# Patient Record
Sex: MALE | Race: WHITE | NOT HISPANIC OR LATINO | Employment: OTHER | URBAN - NONMETROPOLITAN AREA
[De-identification: names, ages, dates, MRNs, and addresses within clinical notes are randomized per-mention and may not be internally consistent; named-entity substitution may affect disease eponyms.]

---

## 2022-06-28 ENCOUNTER — OFFICE VISIT (OUTPATIENT)
Dept: FAMILY MEDICINE CLINIC | Facility: CLINIC | Age: 42
End: 2022-06-28

## 2022-06-28 VITALS
WEIGHT: 152 LBS | SYSTOLIC BLOOD PRESSURE: 174 MMHG | DIASTOLIC BLOOD PRESSURE: 114 MMHG | HEART RATE: 101 BPM | OXYGEN SATURATION: 95 %

## 2022-06-28 DIAGNOSIS — K21.9 CHRONIC GERD: ICD-10-CM

## 2022-06-28 DIAGNOSIS — R03.0 ELEVATED BLOOD PRESSURE READING: ICD-10-CM

## 2022-06-28 DIAGNOSIS — F32.A ANXIETY AND DEPRESSION: Primary | ICD-10-CM

## 2022-06-28 DIAGNOSIS — K59.09 CHRONIC CONSTIPATION: ICD-10-CM

## 2022-06-28 DIAGNOSIS — F41.9 ANXIETY AND DEPRESSION: Primary | ICD-10-CM

## 2022-06-28 DIAGNOSIS — J30.2 SEASONAL ALLERGIES: ICD-10-CM

## 2022-06-28 PROCEDURE — 99214 OFFICE O/P EST MOD 30 MIN: CPT | Performed by: PHYSICIAN ASSISTANT

## 2022-06-28 RX ORDER — VENLAFAXINE HYDROCHLORIDE 75 MG/1
150 CAPSULE, EXTENDED RELEASE ORAL DAILY
COMMUNITY
End: 2022-06-28

## 2022-06-28 RX ORDER — OMEPRAZOLE 40 MG/1
40 CAPSULE, DELAYED RELEASE ORAL DAILY
Qty: 90 CAPSULE | Refills: 1 | Status: SHIPPED | OUTPATIENT
Start: 2022-06-28 | End: 2022-09-30

## 2022-06-28 RX ORDER — LORATADINE 10 MG/1
10 TABLET ORAL DAILY
Qty: 90 TABLET | Refills: 1 | Status: SHIPPED | OUTPATIENT
Start: 2022-06-28 | End: 2023-01-04 | Stop reason: SDUPTHER

## 2022-06-28 RX ORDER — VENLAFAXINE HYDROCHLORIDE 150 MG/1
150 CAPSULE, EXTENDED RELEASE ORAL DAILY
Qty: 90 CAPSULE | Refills: 1 | Status: SHIPPED | OUTPATIENT
Start: 2022-06-28 | End: 2022-09-30

## 2022-06-28 RX ORDER — OMEPRAZOLE 40 MG/1
40 CAPSULE, DELAYED RELEASE ORAL DAILY
COMMUNITY
End: 2022-06-28 | Stop reason: SDUPTHER

## 2022-06-28 NOTE — PROGRESS NOTES
Chief Complaint  Anxiety (Patient reports needing a higher dose of his medicine), Allergies (Patient reports having allergy drainage every morning), and GI Problem (Patient reports stopping his on omeprazole however symptoms have returned)    Subjective        Ramiro Vanegas presents to [facility]  Patient reports today for several complaints.    Patient reports having allergy drainage and states it is worse in the morning reports having blood-tinged mucus that he gets from the back of his throat in the mornings.  Patient denies taking any medication for allergies recently however he has in the past.    Patient reports having acid reflux states he believes his symptoms have been flared up over the past 2 to 3 weeks.  Patient reports his increased abdominal pain, nausea, fullness sensation has caused him to have increased anxiety.  Patient reports he is attempted a bland diet however no relief.  States he started back taking omeprazole this week.    Patient reports history of constipation and internal hemorrhoids.  Patient reports he has been having some constipation over the past couple weeks and states that he had blood when he wiped last week denies any blood for the past 4 days.    Patient reports having anxiety states that he was taking 75 mg however he increased his dose to 150 mg daily over the past month and would like to start taking the higher dose regularly.  Reports it helps better with his symptoms.    Anxiety        Allergies    GI Problem        Objective   Vital Signs:  BP (!) 174/114 (BP Location: Left arm, Patient Position: Sitting, Cuff Size: Adult)   Pulse 101   Wt 68.9 kg (152 lb)   SpO2 95%   There is no height or weight on file to calculate BMI.          Physical Exam  Vitals and nursing note reviewed.   Constitutional:       General: He is not in acute distress.     Appearance: He is not ill-appearing.   Cardiovascular:      Rate and Rhythm: Normal rate and  regular rhythm.      Pulses: Normal pulses.   Pulmonary:      Effort: Pulmonary effort is normal. No respiratory distress.   Abdominal:      General: Bowel sounds are normal. There is no distension.      Palpations: Abdomen is soft.      Tenderness: There is no abdominal tenderness. There is no guarding or rebound.   Musculoskeletal:         General: Normal range of motion.   Skin:     General: Skin is warm and dry.   Psychiatric:         Mood and Affect: Mood normal.        Result Review :                Assessment and Plan   Diagnoses and all orders for this visit:    1. Anxiety and depression (Primary)  Assessment & Plan:  Patient's depression is recurrent and starting to improve with patient increasing dose back to 150 mg.  We will refill patient's medication at 150 mg.  Advised patient to continue to monitor return to clinic if any problems arise.    Orders:  -     venlafaxine XR (EFFEXOR-XR) 150 MG 24 hr capsule; Take 1 capsule by mouth Daily.  Dispense: 90 capsule; Refill: 1    2. Seasonal allergies  Assessment & Plan:  Patient will start loratadine daily    Orders:  -     loratadine (Claritin) 10 MG tablet; Take 1 tablet by mouth Daily. For allergies  Dispense: 90 tablet; Refill: 1    3. Chronic GERD  Assessment & Plan:  Advised patient to restart omeprazole daily, bland diet and return to clinic should symptoms continue or not improved.    Orders:  -     omeprazole (priLOSEC) 40 MG capsule; Take 1 capsule by mouth Daily.  Dispense: 90 capsule; Refill: 1    4. Elevated blood pressure reading  Assessment & Plan:  Discussed at length with patient his blood pressure elevations today.  Provided patient with log and advised him to monitor his blood pressure and return to clinic in 2 weeks if it remains elevated.  Patient acknowledged understanding      5. Chronic constipation  Assessment & Plan:  Vies patient to increase fluids and fiber also advised patient to start using MiraLAX daily.  If bleeding with wiping  returns then return to clinic for follow-up.  Patient declines rectal exam at this date.             Follow Up   No follow-ups on file.  Patient was given instructions and counseling regarding his condition or for health maintenance advice. Please see specific information pulled into the AVS if appropriate.

## 2022-06-28 NOTE — ASSESSMENT & PLAN NOTE
Patient's depression is recurrent and starting to improve with patient increasing dose back to 150 mg.  We will refill patient's medication at 150 mg.  Advised patient to continue to monitor return to clinic if any problems arise.

## 2022-06-28 NOTE — ASSESSMENT & PLAN NOTE
Discussed at length with patient his blood pressure elevations today.  Provided patient with log and advised him to monitor his blood pressure and return to clinic in 2 weeks if it remains elevated.  Patient acknowledged understanding

## 2022-06-28 NOTE — ASSESSMENT & PLAN NOTE
Vies patient to increase fluids and fiber also advised patient to start using MiraLAX daily.  If bleeding with wiping returns then return to clinic for follow-up.  Patient declines rectal exam at this date.

## 2022-06-28 NOTE — ASSESSMENT & PLAN NOTE
Advised patient to restart omeprazole daily, bland diet and return to clinic should symptoms continue or not improved.

## 2022-09-30 DIAGNOSIS — K21.9 CHRONIC GERD: ICD-10-CM

## 2022-09-30 DIAGNOSIS — F32.A ANXIETY AND DEPRESSION: ICD-10-CM

## 2022-09-30 DIAGNOSIS — F41.9 ANXIETY AND DEPRESSION: ICD-10-CM

## 2022-09-30 RX ORDER — OMEPRAZOLE 40 MG/1
40 CAPSULE, DELAYED RELEASE ORAL DAILY
Qty: 90 CAPSULE | Refills: 1 | Status: SHIPPED | OUTPATIENT
Start: 2022-09-30 | End: 2023-01-04 | Stop reason: SDUPTHER

## 2022-09-30 RX ORDER — VENLAFAXINE HYDROCHLORIDE 150 MG/1
CAPSULE, EXTENDED RELEASE ORAL
Qty: 90 CAPSULE | Refills: 1 | Status: SHIPPED | OUTPATIENT
Start: 2022-09-30 | End: 2023-01-04 | Stop reason: SDUPTHER

## 2022-12-16 ENCOUNTER — TELEPHONE (OUTPATIENT)
Dept: FAMILY MEDICINE CLINIC | Facility: CLINIC | Age: 42
End: 2022-12-16

## 2023-01-04 ENCOUNTER — TELEMEDICINE (OUTPATIENT)
Dept: FAMILY MEDICINE CLINIC | Facility: CLINIC | Age: 43
End: 2023-01-04
Payer: MEDICAID

## 2023-01-04 VITALS
WEIGHT: 152 LBS | HEIGHT: 66 IN | SYSTOLIC BLOOD PRESSURE: 160 MMHG | DIASTOLIC BLOOD PRESSURE: 100 MMHG | BODY MASS INDEX: 24.43 KG/M2

## 2023-01-04 DIAGNOSIS — F32.A ANXIETY AND DEPRESSION: ICD-10-CM

## 2023-01-04 DIAGNOSIS — G47.00 INSOMNIA, PERSISTENT: ICD-10-CM

## 2023-01-04 DIAGNOSIS — J30.2 SEASONAL ALLERGIES: ICD-10-CM

## 2023-01-04 DIAGNOSIS — K21.9 CHRONIC GERD: ICD-10-CM

## 2023-01-04 DIAGNOSIS — I10 HYPERTENSION, ESSENTIAL: Primary | ICD-10-CM

## 2023-01-04 DIAGNOSIS — F41.9 ANXIETY AND DEPRESSION: ICD-10-CM

## 2023-01-04 PROBLEM — R03.0 ELEVATED BLOOD PRESSURE READING: Status: RESOLVED | Noted: 2022-06-28 | Resolved: 2023-01-04

## 2023-01-04 PROBLEM — K59.09 CHRONIC CONSTIPATION: Status: RESOLVED | Noted: 2022-06-28 | Resolved: 2023-01-04

## 2023-01-04 PROCEDURE — 99214 OFFICE O/P EST MOD 30 MIN: CPT | Performed by: FAMILY MEDICINE

## 2023-01-04 RX ORDER — OMEPRAZOLE 40 MG/1
40 CAPSULE, DELAYED RELEASE ORAL DAILY
Qty: 90 CAPSULE | Refills: 1 | Status: SHIPPED | OUTPATIENT
Start: 2023-01-04 | End: 2023-02-16 | Stop reason: SDUPTHER

## 2023-01-04 RX ORDER — LORATADINE 10 MG/1
10 TABLET ORAL DAILY
Qty: 90 TABLET | Refills: 1 | Status: SHIPPED | OUTPATIENT
Start: 2023-01-04 | End: 2023-02-16 | Stop reason: SDUPTHER

## 2023-01-04 RX ORDER — VENLAFAXINE HYDROCHLORIDE 150 MG/1
150 CAPSULE, EXTENDED RELEASE ORAL DAILY
Qty: 90 CAPSULE | Refills: 1 | Status: SHIPPED | OUTPATIENT
Start: 2023-01-04 | End: 2023-02-16 | Stop reason: SDUPTHER

## 2023-01-04 RX ORDER — AMLODIPINE AND VALSARTAN 5; 160 MG/1; MG/1
1 TABLET ORAL DAILY
Qty: 30 TABLET | Refills: 1 | Status: SHIPPED | OUTPATIENT
Start: 2023-01-04 | End: 2023-02-16 | Stop reason: SDUPTHER

## 2023-01-04 RX ORDER — TRAZODONE HYDROCHLORIDE 50 MG/1
50-100 TABLET ORAL NIGHTLY PRN
Qty: 60 TABLET | Refills: 1 | Status: SHIPPED | OUTPATIENT
Start: 2023-01-04 | End: 2023-02-16 | Stop reason: SDUPTHER

## 2023-01-04 NOTE — ASSESSMENT & PLAN NOTE
Discussed poorly controlled blood pressure readings from home and reviewed last visit in the office.    Treatment options discussed.    Given degree of elevation will start Exforge.  Side effects reviewed.    Return for an office visit for physical exam and blood pressure check next month.  We will also get fasting blood work up-to-date at follow-up    Patient voiced understanding is comfortable plan.

## 2023-01-04 NOTE — ASSESSMENT & PLAN NOTE
Continue with Effexor XR.  Added trazodone for recurrent problems with insomnia but did discuss this is likely related to his alcohol use in part.    Recheck in 1 month or sooner problems arise.  Side effects of  trazodone reviewed.

## 2023-01-04 NOTE — PROGRESS NOTES
Patient was seen today through synchronous audio/video technology. Verbal consent was obtained. The patient was located at home. Vitals signs were not obtained due to lack of home monitoring access.       I was located at our [facility] office for this telehealth visit.    Chief Complaint  Insomnia (Patient is having trouble sleeping at night)    History of Present Illness  Ramiro Vanegas is a 42 y.o. male presenting via video-conference today for follow-up visit medication refills.    At his last in office visit his blood pressure was elevated and he was instructed to check a blood pressure log and return with readings.  He has been checking blood pressures but did not return or notify us of ongoing blood pressure elevation.  Most readings are in the 160s systolic and 100s diastolic.  No chest pain or chest pressure.    He does continue to drink on a regular basis and is still smoking.  He does understand that both alcohol and tobacco are likely contributing to his blood pressure elevation.    He has plans to work on cutting back or quitting.    He will return next months for his physical.    Mood stable with Effexor but having insomnia issues.  We did discuss that alcohol can greatly contribute to this and he would like to try trazodone while he works on cutting back on alcohol use.    Doing well with Claritin for allergies.    GERD stable with omeprazole        The following portions of the patient's history were reviewed and updated as appropriate: allergies, current medications, past family history, past medical history, past social history, past surgical history and problem list.    Review of Systems   Constitutional: Negative for appetite change, chills, fever and unexpected weight change.   HENT: Negative for hearing loss.    Eyes: Negative for visual disturbance.   Respiratory: Negative for chest tightness, shortness of breath and wheezing.    Cardiovascular: Negative for  chest pain, palpitations and leg swelling.   Gastrointestinal: Negative for abdominal pain.   Musculoskeletal: Negative for arthralgias, back pain and gait problem.   Skin: Negative for rash.   Neurological: Negative for dizziness and headaches.   Psychiatric/Behavioral: Positive for sleep disturbance. Negative for agitation and confusion. The patient is not nervous/anxious.        Objective  /100   Ht 167.6 cm (66\")   Wt 68.9 kg (152 lb)   BMI 24.53 kg/m²     Physical Exam  Constitutional:       General: He is not in acute distress.     Appearance: Normal appearance. He is not ill-appearing.   HENT:      Head: Normocephalic and atraumatic.      Right Ear: External ear normal.      Left Ear: External ear normal.      Nose: Nose normal.   Eyes:      Extraocular Movements: Extraocular movements intact.      Conjunctiva/sclera: Conjunctivae normal.      Pupils: Pupils are equal, round, and reactive to light.   Pulmonary:      Effort: Pulmonary effort is normal. No respiratory distress.   Musculoskeletal:         General: Normal range of motion.      Cervical back: Normal range of motion.   Skin:     Findings: No rash.   Neurological:      General: No focal deficit present.      Mental Status: He is alert and oriented to person, place, and time.   Psychiatric:         Mood and Affect: Mood normal.         Behavior: Behavior normal.           Assessment/Plan   Diagnoses and all orders for this visit:    1. Hypertension, essential (Primary)  Assessment & Plan:  Discussed poorly controlled blood pressure readings from home and reviewed last visit in the office.    Treatment options discussed.    Given degree of elevation will start Exforge.  Side effects reviewed.    Return for an office visit for physical exam and blood pressure check next month.  We will also get fasting blood work up-to-date at follow-up    Patient voiced understanding is comfortable plan.    Orders:  -     amLODIPine-valsartan (Exforge) 5-160  MG per tablet; Take 1 tablet by mouth Daily. For blood pressure  Dispense: 30 tablet; Refill: 1    2. Anxiety and depression  Assessment & Plan:  Continue with Effexor XR.  Added trazodone for recurrent problems with insomnia but did discuss this is likely related to his alcohol use in part.    Recheck in 1 month or sooner problems arise.  Side effects of  trazodone reviewed.    Orders:  -     venlafaxine XR (EFFEXOR-XR) 150 MG 24 hr capsule; Take 1 capsule by mouth Daily.  Dispense: 90 capsule; Refill: 1    3. Seasonal allergies  Assessment & Plan:  Continue Claritin.    Orders:  -     loratadine (Claritin) 10 MG tablet; Take 1 tablet by mouth Daily. For allergies  Dispense: 90 tablet; Refill: 1    4. Chronic GERD  Assessment & Plan:  Controlled with omeprazole.    Advised to work on alcohol and tobacco cessation.    Orders:  -     omeprazole (priLOSEC) 40 MG capsule; Take 1 capsule by mouth Daily.  Dispense: 90 capsule; Refill: 1    5. Insomnia, persistent  Assessment & Plan:  See above    Orders:  -     traZODone (DESYREL) 50 MG tablet; Take 1-2 tablets by mouth At Night As Needed for Sleep.  Dispense: 60 tablet; Refill: 1    BMI is within normal parameters. No other follow-up for BMI required.    Return in about 4 weeks (around 2/1/2023) for Annual physical, Med recheck.    Scott Hernández MD

## 2023-01-25 ENCOUNTER — PATIENT MESSAGE (OUTPATIENT)
Dept: FAMILY MEDICINE CLINIC | Facility: CLINIC | Age: 43
End: 2023-01-25
Payer: MEDICAID

## 2023-01-25 NOTE — TELEPHONE ENCOUNTER
From: Ramiro Vanegas  To: Scott Hernández  Sent: 1/25/2023 1:21 PM EST  Subject: Ramiro Hernández , I had some teeth cut on Monday 16th. I have had several issue with them. Now I have a couple of ulcers in my mouth on the tongue . It hurts to eat, swallow , or talk. What can I do , to fix this . I called to make an appointment with you but they schedule it with someone on the 31st. Could you please call me [phone] thank you

## 2023-02-16 ENCOUNTER — TELEMEDICINE (OUTPATIENT)
Dept: FAMILY MEDICINE CLINIC | Facility: CLINIC | Age: 43
End: 2023-02-16
Payer: MEDICAID

## 2023-02-16 DIAGNOSIS — F32.A ANXIETY AND DEPRESSION: ICD-10-CM

## 2023-02-16 DIAGNOSIS — K21.9 CHRONIC GERD: ICD-10-CM

## 2023-02-16 DIAGNOSIS — F41.9 ANXIETY AND DEPRESSION: ICD-10-CM

## 2023-02-16 DIAGNOSIS — I10 HYPERTENSION, ESSENTIAL: ICD-10-CM

## 2023-02-16 DIAGNOSIS — G47.00 INSOMNIA, PERSISTENT: ICD-10-CM

## 2023-02-16 DIAGNOSIS — J30.2 SEASONAL ALLERGIES: ICD-10-CM

## 2023-02-16 PROCEDURE — 99214 OFFICE O/P EST MOD 30 MIN: CPT | Performed by: FAMILY MEDICINE

## 2023-02-16 RX ORDER — AMLODIPINE AND VALSARTAN 5; 160 MG/1; MG/1
1 TABLET ORAL DAILY
Qty: 90 TABLET | Refills: 1 | Status: SHIPPED | OUTPATIENT
Start: 2023-02-16 | End: 2023-03-29

## 2023-02-16 RX ORDER — BUPROPION HYDROCHLORIDE 150 MG/1
150 TABLET ORAL EVERY MORNING
Qty: 90 TABLET | Refills: 1 | Status: SHIPPED | OUTPATIENT
Start: 2023-02-16 | End: 2023-03-29

## 2023-02-16 RX ORDER — VENLAFAXINE HYDROCHLORIDE 150 MG/1
150 CAPSULE, EXTENDED RELEASE ORAL DAILY
Qty: 90 CAPSULE | Refills: 1 | Status: SHIPPED | OUTPATIENT
Start: 2023-02-16 | End: 2023-03-29 | Stop reason: SDUPTHER

## 2023-02-16 RX ORDER — LORATADINE 10 MG/1
10 TABLET ORAL DAILY
Qty: 90 TABLET | Refills: 1 | Status: SHIPPED | OUTPATIENT
Start: 2023-02-16 | End: 2023-03-29 | Stop reason: SDUPTHER

## 2023-02-16 RX ORDER — TRAZODONE HYDROCHLORIDE 50 MG/1
50-100 TABLET ORAL NIGHTLY PRN
Qty: 180 TABLET | Refills: 1 | Status: SHIPPED | OUTPATIENT
Start: 2023-02-16 | End: 2023-03-29

## 2023-02-16 RX ORDER — OMEPRAZOLE 40 MG/1
40 CAPSULE, DELAYED RELEASE ORAL DAILY
Qty: 90 CAPSULE | Refills: 1 | Status: SHIPPED | OUTPATIENT
Start: 2023-02-16 | End: 2023-03-29 | Stop reason: SDUPTHER

## 2023-02-16 NOTE — ASSESSMENT & PLAN NOTE
Continue with Effexor XR and trazodone.  We will add in Wellbutrin XL to help with sexual side effects noted with Effexor XR.    No history of seizures.    Discussed Wellbutrin XL can help with cravings but naltrexone is typically better.  He will consider follow-up if not improving with cravings and work with counseling for alcohol use to consider naltrexone..

## 2023-02-16 NOTE — ASSESSMENT & PLAN NOTE
Feeling much better with Exforge.  Encouraged home blood pressure checks and he will send us blood pressure readings for chart update..

## 2023-02-16 NOTE — PROGRESS NOTES
Patient was seen today through synchronous audio/video technology. Verbal consent was obtained. The patient was located at home. Vitals signs were not obtained due to lack of home monitoring access.     I was located at our [facility] office for this telehealth visit.    Chief Complaint  No chief complaint on file.    History of Present Illness  Ramiro Vanegas is a 43 y.o. male presenting via video-conference today for follow-up after starting on Exforge for hypertension along with trazodone for insomnia.    He does girlfriend and been going to counseling which has been helpful in general for relationship struggles but he still has had some problems with sexual side effects with his Effexor.  He would like to try adding Wellbutrin XL to help with the sexual side effects with delayed orgasm.    He is working on cutting back and quitting drinking and we discussed Wellbutrin can sometimes help cravings but naltrexone is typically better.  He would like to start with Wellbutrin XL first and if needed we will follow-up to discuss naltrexone treatment options for alcohol cravings.    Doing well with omeprazole for GERD.    Trazodone is working well for insomnia.    He will get his some home blood pressure checks to update his chart after starting Exforge but reports headaches have resolved and he has been feeling much better in the past few weeks compared to how he has felt in the past few months exclamation    The following portions of the patient's history were reviewed and updated as appropriate: allergies, current medications, past family history, past medical history, past social history, past surgical history and problem list.    Review of Systems   Constitutional: Negative for appetite change, chills, fever and unexpected weight change.   HENT: Negative for hearing loss.    Eyes: Negative for visual disturbance.   Respiratory: Negative for chest tightness, shortness of breath and  wheezing.    Cardiovascular: Negative for chest pain, palpitations and leg swelling.   Gastrointestinal: Negative for abdominal pain.   Genitourinary:        See HPI   Musculoskeletal: Negative for arthralgias, back pain and gait problem.   Skin: Negative for rash.   Neurological: Negative for dizziness and headaches.   Psychiatric/Behavioral: Negative for agitation and confusion. The patient is nervous/anxious.        Objective  There were no vitals taken for this visit.    Physical Exam  Constitutional:       General: He is not in acute distress.     Appearance: Normal appearance. He is not ill-appearing.   HENT:      Head: Normocephalic and atraumatic.      Right Ear: External ear normal.      Left Ear: External ear normal.      Nose: Nose normal.   Eyes:      Extraocular Movements: Extraocular movements intact.      Conjunctiva/sclera: Conjunctivae normal.      Pupils: Pupils are equal, round, and reactive to light.   Pulmonary:      Effort: Pulmonary effort is normal. No respiratory distress.   Musculoskeletal:         General: Normal range of motion.      Cervical back: Normal range of motion.   Skin:     Findings: No rash.   Neurological:      General: No focal deficit present.      Mental Status: He is alert and oriented to person, place, and time.   Psychiatric:         Mood and Affect: Mood normal.         Behavior: Behavior normal.         Thought Content: Thought content normal.      Comments: Mood improving with combination of Effexor XR and trazodone.  Sexual side effects with delayed orgasm noted with Effexor.  Interested in adding Wellbutrin XL.  No history of seizures           Assessment/Plan   Diagnoses and all orders for this visit:    1. Anxiety and depression  Assessment & Plan:  Continue with Effexor XR and trazodone.  We will add in Wellbutrin XL to help with sexual side effects noted with Effexor XR.    No history of seizures.    Discussed Wellbutrin XL can help with cravings but naltrexone  is typically better.  He will consider follow-up if not improving with cravings and work with counseling for alcohol use to consider naltrexone..    Orders:  -     venlafaxine XR (EFFEXOR-XR) 150 MG 24 hr capsule; Take 1 capsule by mouth Daily.  Dispense: 90 capsule; Refill: 1    2. Insomnia, persistent  Assessment & Plan:  Continue trazodone.    Orders:  -     traZODone (DESYREL) 50 MG tablet; Take 1-2 tablets by mouth At Night As Needed for Sleep.  Dispense: 180 tablet; Refill: 1    3. Chronic GERD  Assessment & Plan:  Controlled with omeprazole.  Alcohol cessation encouraged    Orders:  -     omeprazole (priLOSEC) 40 MG capsule; Take 1 capsule by mouth Daily.  Dispense: 90 capsule; Refill: 1    4. Seasonal allergies  Assessment & Plan:  Stable controlled with current regimen    Orders:  -     loratadine (Claritin) 10 MG tablet; Take 1 tablet by mouth Daily. For allergies  Dispense: 90 tablet; Refill: 1    5. Hypertension, essential  Assessment & Plan:  Feeling much better with Exforge.  Encouraged home blood pressure checks and he will send us blood pressure readings for chart update..    Orders:  -     amLODIPine-valsartan (Exforge) 5-160 MG per tablet; Take 1 tablet by mouth Daily. For blood pressure  Dispense: 90 tablet; Refill: 1    Other orders  -     buPROPion XL (Wellbutrin XL) 150 MG 24 hr tablet; Take 1 tablet by mouth Every Morning.  Dispense: 90 tablet; Refill: 1      BMI is within normal parameters. No other follow-up for BMI required.       Return in about 6 months (around 8/16/2023) for Med recheck.    Scott Hernández MD

## 2023-03-29 ENCOUNTER — OFFICE VISIT (OUTPATIENT)
Dept: FAMILY MEDICINE CLINIC | Facility: CLINIC | Age: 43
End: 2023-03-29
Payer: MEDICAID

## 2023-03-29 VITALS
WEIGHT: 175 LBS | SYSTOLIC BLOOD PRESSURE: 140 MMHG | DIASTOLIC BLOOD PRESSURE: 100 MMHG | BODY MASS INDEX: 28.12 KG/M2 | HEIGHT: 66 IN

## 2023-03-29 DIAGNOSIS — Z12.5 PROSTATE CANCER SCREENING: ICD-10-CM

## 2023-03-29 DIAGNOSIS — G47.00 INSOMNIA, PERSISTENT: Chronic | ICD-10-CM

## 2023-03-29 DIAGNOSIS — F32.A ANXIETY AND DEPRESSION: Chronic | ICD-10-CM

## 2023-03-29 DIAGNOSIS — Z13.1 DIABETES MELLITUS SCREENING: ICD-10-CM

## 2023-03-29 DIAGNOSIS — F41.9 ANXIETY AND DEPRESSION: Chronic | ICD-10-CM

## 2023-03-29 DIAGNOSIS — Z00.00 GENERAL MEDICAL EXAM: Primary | ICD-10-CM

## 2023-03-29 DIAGNOSIS — J30.2 SEASONAL ALLERGIES: Chronic | ICD-10-CM

## 2023-03-29 DIAGNOSIS — M79.672 LEFT FOOT PAIN: ICD-10-CM

## 2023-03-29 DIAGNOSIS — F10.20 ALCOHOL USE DISORDER, SEVERE, DEPENDENCE: ICD-10-CM

## 2023-03-29 DIAGNOSIS — K21.9 CHRONIC GERD: Chronic | ICD-10-CM

## 2023-03-29 DIAGNOSIS — I10 HYPERTENSION, ESSENTIAL: Chronic | ICD-10-CM

## 2023-03-29 RX ORDER — OMEPRAZOLE 40 MG/1
40 CAPSULE, DELAYED RELEASE ORAL DAILY
Qty: 90 CAPSULE | Refills: 1 | Status: SHIPPED | OUTPATIENT
Start: 2023-03-29

## 2023-03-29 RX ORDER — LORATADINE 10 MG/1
10 TABLET ORAL DAILY
Qty: 90 TABLET | Refills: 1 | Status: SHIPPED | OUTPATIENT
Start: 2023-03-29

## 2023-03-29 RX ORDER — NALTREXONE HYDROCHLORIDE 50 MG/1
50 TABLET, FILM COATED ORAL NIGHTLY
Qty: 30 TABLET | Refills: 2 | Status: SHIPPED | OUTPATIENT
Start: 2023-03-29

## 2023-03-29 RX ORDER — VENLAFAXINE HYDROCHLORIDE 150 MG/1
150 CAPSULE, EXTENDED RELEASE ORAL DAILY
Qty: 90 CAPSULE | Refills: 1 | Status: SHIPPED | OUTPATIENT
Start: 2023-03-29

## 2023-03-29 RX ORDER — AMLODIPINE AND VALSARTAN 10; 320 MG/1; MG/1
1 TABLET ORAL DAILY
Qty: 90 TABLET | Refills: 1 | Status: SHIPPED | OUTPATIENT
Start: 2023-03-29

## 2023-03-29 NOTE — ASSESSMENT & PLAN NOTE
Discussed together health maintenance and screening along with vaccination options and healthy diet and exercise habits as part of the preventative counseling at their physical exam today.

## 2023-03-29 NOTE — ASSESSMENT & PLAN NOTE
Hypertension is worsening.  Medication changes per orders.  Blood pressure will be reassessed in 4 weeks     Alcohol cessation encouraged along with inpatient detox.  Patient declines.    He wants to work on cutting back on his drinking.    We will adjust up his Exforge to 10/320 and recheck on blood pressure in 1 month.

## 2023-03-29 NOTE — PROGRESS NOTES
"Chief Complaint  Annual Exam (Patient is fasting)    Subjective    History of Present Illness:  Ramiro Vanegas is a 43 y.o. male who presents today for physical exam and fasting lab work.    He is here with his current girlfriend and continues to drink on a daily basis.  He tends to drink between 6 and 15 beers daily.  He is started having an eye opener each morning to help with tremors.  We have discussed at length his alcoholism and need for inpatient detoxification to prevent problems with withdrawal seizures and DTs.  He is not ready for detox at this time.    He has had some family members and friends do well with naltrexone and would like to try that help with cravings and is dalton work on cutting back on his drinking even though we discussed typically this will not work given his alcoholic tendencies.  We did discuss detox and abstinence again to be the best methods for him to be successful regarding his severe alcohol use disorder.    He voiced understanding but is not ready at this point for detox.    Blood pressure is uncontrolled today and he has not missed any doses of Exforge.  He would like to go up to the higher dosing before adding another medicine.    Side effects with Wellbutrin XL so is only taking Effexor XR for anxiety.  Again, would like to add in naltrexone to help with cravings    GERD stable with omeprazole.    Willing for PSA with fasting labs today      Objective   Vital Signs:   /100 (BP Location: Left arm, Patient Position: Sitting, Cuff Size: Adult)   Ht 167.6 cm (66\")   Wt 79.4 kg (175 lb)   BMI 28.25 kg/m²     Review of Systems   Constitutional: Negative for appetite change, chills and fever.   HENT: Negative for hearing loss.    Eyes: Negative for blurred vision.   Respiratory: Negative for chest tightness.    Cardiovascular: Negative for chest pain.   Gastrointestinal: Negative for abdominal pain.   Genitourinary: Positive for erectile dysfunction.   Musculoskeletal: Negative " for gait problem.        Left foot pain after trip and fall injury at home.  Negative x-rays in [facility].   Skin: Negative for rash.   Neurological: Positive for tremors.        Tremors related to alcohol withdrawal.   Psychiatric/Behavioral: Negative for depressed mood. The patient is nervous/anxious.         See HPI       Past History:  Medical History: has a past medical history of GERD without esophagitis and Recurrent major depression in partial remission (HCC).   Surgical History: has no past surgical history on file.   Family History: family history includes Breast cancer in his mother; Depression in his mother; Diabetes in his father; Hearing loss in his father; Hypertension in his mother.   Social History: reports that he has been smoking. He has never used smokeless tobacco. Alcohol use questions deferred to the physician. He reports that he does not use drugs.      Current Outpatient Medications:   •  loratadine (Claritin) 10 MG tablet, Take 1 tablet by mouth Daily. For allergies, Disp: 90 tablet, Rfl: 1  •  omeprazole (priLOSEC) 40 MG capsule, Take 1 capsule by mouth Daily., Disp: 90 capsule, Rfl: 1  •  venlafaxine XR (EFFEXOR-XR) 150 MG 24 hr capsule, Take 1 capsule by mouth Daily., Disp: 90 capsule, Rfl: 1  •  amLODIPine-valsartan (Exforge)  MG per tablet, Take 1 tablet by mouth Daily., Disp: 90 tablet, Rfl: 1  •  naltrexone (DEPADE) 50 MG tablet, Take 1 tablet by mouth Every Night., Disp: 30 tablet, Rfl: 2    Allergies: Patient has no known allergies.    Physical Exam  HENT:      Head: Normocephalic.      Right Ear: External ear normal.      Left Ear: External ear normal.      Nose: Nose normal.   Eyes:      Pupils: Pupils are equal, round, and reactive to light.   Cardiovascular:      Rate and Rhythm: Normal rate and regular rhythm.      Heart sounds: Normal heart sounds.   Pulmonary:      Effort: Pulmonary effort is normal.      Breath sounds: Normal breath sounds.   Musculoskeletal:       Cervical back: Normal range of motion.      Comments: Left foot with mild mid metatarsal bruising.  Fourth and fifth metatarsal tenderness on exam without evidence of displacement   Skin:     General: Skin is warm and dry.   Neurological:      General: No focal deficit present.      Mental Status: He is alert.   Psychiatric:         Behavior: Behavior normal.      Comments: Discussed openly his severe alcohol use disorder and recommendation for inpatient detox followed by AA participation in naltrexone as a tool to help with cravings and relapse prevention.  He is not yet ready to quit drinking but understands this would be the recommended path given the severity of his alcohol use disorder and symptoms that are suggestive for high risk for alcohol withdrawal seizures and DTs           Result Review                   Assessment and Plan  Diagnoses and all orders for this visit:    1. General medical exam (Primary)  Assessment & Plan:  Discussed together health maintenance and screening along with vaccination options and healthy diet and exercise habits as part of the preventative counseling at their physical exam today.    Orders:  -     CBC Auto Differential; Future  -     Comprehensive Metabolic Panel; Future  -     Lipid Panel; Future  -     TSH; Future  -     T4, Free; Future  -     CBC Auto Differential  -     Comprehensive Metabolic Panel  -     Lipid Panel  -     TSH  -     T4, Free    2. Diabetes mellitus screening  -     Hemoglobin A1c; Future  -     Hemoglobin A1c    3. Hypertension, essential  Assessment & Plan:  Hypertension is worsening.  Medication changes per orders.  Blood pressure will be reassessed in 4 weeks     Alcohol cessation encouraged along with inpatient detox.  Patient declines.    He wants to work on cutting back on his drinking.    We will adjust up his Exforge to 10/320 and recheck on blood pressure in 1 month.    Orders:  -     CBC Auto Differential; Future  -     Comprehensive  Metabolic Panel; Future  -     Lipid Panel; Future  -     TSH; Future  -     T4, Free; Future  -     amLODIPine-valsartan (Exforge)  MG per tablet; Take 1 tablet by mouth Daily.  Dispense: 90 tablet; Refill: 1  -     CBC Auto Differential  -     Comprehensive Metabolic Panel  -     Lipid Panel  -     TSH  -     T4, Free    4. Insomnia, persistent  Assessment & Plan:  Off trazodone given headaches.  Discussed his alcohol use disorder may be negatively affecting his sleep patterns.  Encouraged alcohol cessation as above.      5. Anxiety and depression  Assessment & Plan:  Continue Effexor XR.  We discussed naltrexone as a tool to help with cravings and relapse prevention given his desire to cut back on drinking.  Side effects and expectations discussed.  Recheck at follow-up in 1 month.  We did discuss the risk of liver enzyme elevation with naltrexone today.    Orders:  -     venlafaxine XR (EFFEXOR-XR) 150 MG 24 hr capsule; Take 1 capsule by mouth Daily.  Dispense: 90 capsule; Refill: 1    6. Chronic GERD  Assessment & Plan:  Well-controlled with omeprazole    Orders:  -     omeprazole (priLOSEC) 40 MG capsule; Take 1 capsule by mouth Daily.  Dispense: 90 capsule; Refill: 1    7. Seasonal allergies  Assessment & Plan:  Continue Flonase and Claritin    Orders:  -     loratadine (Claritin) 10 MG tablet; Take 1 tablet by mouth Daily. For allergies  Dispense: 90 tablet; Refill: 1    8. Prostate cancer screening  -     PSA Screen; Future  -     naltrexone (DEPADE) 50 MG tablet; Take 1 tablet by mouth Every Night.  Dispense: 30 tablet; Refill: 2  -     PSA Screen    9. Alcohol use disorder, severe, dependence (HCC)  Assessment & Plan:  See above      10. Left foot pain  Assessment & Plan:  Mild bruising and mid metatarsal tenderness.  We will recheck x-ray today given his initial x-ray was done on the day of injury and may have missed a subtle fracture.    If he has ongoing pain plan for podiatry consultation and  MRI imaging.    Orders:  -     XR Foot 3+ View Left; Future      BMI is >= 25 and <30. (Overweight) The following options were offered after discussion;: exercise counseling/recommendations and nutrition counseling/recommendations          Follow Up  Return in about 4 weeks (around 4/26/2023) for Med recheck.    Scott Hernández MD

## 2023-03-29 NOTE — ASSESSMENT & PLAN NOTE
Off trazodone given headaches.  Discussed his alcohol use disorder may be negatively affecting his sleep patterns.  Encouraged alcohol cessation as above.

## 2023-03-29 NOTE — ASSESSMENT & PLAN NOTE
Mild bruising and mid metatarsal tenderness.  We will recheck x-ray today given his initial x-ray was done on the day of injury and may have missed a subtle fracture.    If he has ongoing pain plan for podiatry consultation and MRI imaging.

## 2023-03-29 NOTE — ASSESSMENT & PLAN NOTE
Continue Effexor XR.  We discussed naltrexone as a tool to help with cravings and relapse prevention given his desire to cut back on drinking.  Side effects and expectations discussed.  Recheck at follow-up in 1 month.  We did discuss the risk of liver enzyme elevation with naltrexone today.

## 2023-03-30 DIAGNOSIS — E78.2 HYPERLIPIDEMIA, MIXED: Primary | ICD-10-CM

## 2023-03-30 LAB
ALBUMIN SERPL-MCNC: 5.2 G/DL (ref 4–5)
ALBUMIN/GLOB SERPL: 2.1 {RATIO} (ref 1.2–2.2)
ALP SERPL-CCNC: 105 IU/L (ref 44–121)
ALT SERPL-CCNC: 39 IU/L (ref 0–44)
AST SERPL-CCNC: 31 IU/L (ref 0–40)
BASOPHILS # BLD AUTO: 0.1 X10E3/UL (ref 0–0.2)
BASOPHILS NFR BLD AUTO: 1 %
BILIRUB SERPL-MCNC: 0.3 MG/DL (ref 0–1.2)
BUN SERPL-MCNC: 13 MG/DL (ref 6–24)
BUN/CREAT SERPL: 17 (ref 9–20)
CALCIUM SERPL-MCNC: 10.2 MG/DL (ref 8.7–10.2)
CHLORIDE SERPL-SCNC: 100 MMOL/L (ref 96–106)
CHOLEST SERPL-MCNC: 266 MG/DL (ref 100–199)
CO2 SERPL-SCNC: 22 MMOL/L (ref 20–29)
CREAT SERPL-MCNC: 0.78 MG/DL (ref 0.76–1.27)
EGFRCR SERPLBLD CKD-EPI 2021: 115 ML/MIN/1.73
EOSINOPHIL # BLD AUTO: 0 X10E3/UL (ref 0–0.4)
EOSINOPHIL NFR BLD AUTO: 0 %
ERYTHROCYTE [DISTWIDTH] IN BLOOD BY AUTOMATED COUNT: 12.2 % (ref 11.6–15.4)
GLOBULIN SER CALC-MCNC: 2.5 G/DL (ref 1.5–4.5)
GLUCOSE SERPL-MCNC: 100 MG/DL (ref 70–99)
HBA1C MFR BLD: 5.6 % (ref 4.8–5.6)
HCT VFR BLD AUTO: 46.4 % (ref 37.5–51)
HDLC SERPL-MCNC: 93 MG/DL
HGB BLD-MCNC: 15.2 G/DL (ref 13–17.7)
IMM GRANULOCYTES # BLD AUTO: 0.1 X10E3/UL (ref 0–0.1)
IMM GRANULOCYTES NFR BLD AUTO: 1 %
LDLC SERPL CALC-MCNC: 162 MG/DL (ref 0–99)
LYMPHOCYTES # BLD AUTO: 1.9 X10E3/UL (ref 0.7–3.1)
LYMPHOCYTES NFR BLD AUTO: 15 %
MCH RBC QN AUTO: 30.7 PG (ref 26.6–33)
MCHC RBC AUTO-ENTMCNC: 32.8 G/DL (ref 31.5–35.7)
MCV RBC AUTO: 94 FL (ref 79–97)
MONOCYTES # BLD AUTO: 1 X10E3/UL (ref 0.1–0.9)
MONOCYTES NFR BLD AUTO: 8 %
NEUTROPHILS # BLD AUTO: 9.6 X10E3/UL (ref 1.4–7)
NEUTROPHILS NFR BLD AUTO: 75 %
PLATELET # BLD AUTO: 370 X10E3/UL (ref 150–450)
POTASSIUM SERPL-SCNC: 4.6 MMOL/L (ref 3.5–5.2)
PROT SERPL-MCNC: 7.7 G/DL (ref 6–8.5)
PSA SERPL-MCNC: 0.5 NG/ML (ref 0–4)
RBC # BLD AUTO: 4.95 X10E6/UL (ref 4.14–5.8)
SODIUM SERPL-SCNC: 139 MMOL/L (ref 134–144)
T4 FREE SERPL-MCNC: 1.02 NG/DL (ref 0.82–1.77)
TRIGL SERPL-MCNC: 70 MG/DL (ref 0–149)
TSH SERPL DL<=0.005 MIU/L-ACNC: 1.77 UIU/ML (ref 0.45–4.5)
VLDLC SERPL CALC-MCNC: 11 MG/DL (ref 5–40)
WBC # BLD AUTO: 12.6 X10E3/UL (ref 3.4–10.8)

## 2023-03-30 RX ORDER — ROSUVASTATIN CALCIUM 20 MG/1
20 TABLET, COATED ORAL DAILY
Qty: 90 TABLET | Refills: 1 | Status: SHIPPED | OUTPATIENT
Start: 2023-03-30

## 2023-04-03 ENCOUNTER — TELEPHONE (OUTPATIENT)
Dept: FAMILY MEDICINE CLINIC | Facility: CLINIC | Age: 43
End: 2023-04-03

## 2023-05-03 ENCOUNTER — TELEMEDICINE (OUTPATIENT)
Dept: FAMILY MEDICINE CLINIC | Facility: CLINIC | Age: 43
End: 2023-05-03
Payer: MEDICAID

## 2023-05-03 VITALS
BODY MASS INDEX: 28.61 KG/M2 | HEIGHT: 66 IN | DIASTOLIC BLOOD PRESSURE: 80 MMHG | WEIGHT: 178 LBS | SYSTOLIC BLOOD PRESSURE: 135 MMHG

## 2023-05-03 DIAGNOSIS — I10 HYPERTENSION, ESSENTIAL: Primary | Chronic | ICD-10-CM

## 2023-05-03 DIAGNOSIS — F10.20 ALCOHOL USE DISORDER, SEVERE, DEPENDENCE: ICD-10-CM

## 2023-05-03 DIAGNOSIS — K21.9 CHRONIC GERD: Chronic | ICD-10-CM

## 2023-05-03 DIAGNOSIS — F41.9 ANXIETY AND DEPRESSION: Chronic | ICD-10-CM

## 2023-05-03 DIAGNOSIS — J30.2 SEASONAL ALLERGIES: Chronic | ICD-10-CM

## 2023-05-03 DIAGNOSIS — F32.A ANXIETY AND DEPRESSION: Chronic | ICD-10-CM

## 2023-05-03 DIAGNOSIS — E78.2 HYPERLIPIDEMIA, MIXED: ICD-10-CM

## 2023-05-03 RX ORDER — LORATADINE 10 MG/1
10 TABLET ORAL DAILY
Qty: 90 TABLET | Refills: 1 | Status: SHIPPED | OUTPATIENT
Start: 2023-05-03

## 2023-05-03 RX ORDER — NALTREXONE HYDROCHLORIDE 50 MG/1
50 TABLET, FILM COATED ORAL NIGHTLY
Qty: 90 TABLET | Refills: 1 | Status: SHIPPED | OUTPATIENT
Start: 2023-05-03

## 2023-05-03 RX ORDER — OMEPRAZOLE 40 MG/1
40 CAPSULE, DELAYED RELEASE ORAL DAILY
Qty: 90 CAPSULE | Refills: 1 | Status: SHIPPED | OUTPATIENT
Start: 2023-05-03

## 2023-05-03 RX ORDER — ROSUVASTATIN CALCIUM 20 MG/1
20 TABLET, COATED ORAL DAILY
Qty: 90 TABLET | Refills: 1 | Status: SHIPPED | OUTPATIENT
Start: 2023-05-03

## 2023-05-03 RX ORDER — BUPROPION HYDROCHLORIDE 150 MG/1
50 TABLET ORAL DAILY
COMMUNITY
Start: 2023-04-12 | End: 2023-05-03

## 2023-05-03 RX ORDER — AMLODIPINE AND VALSARTAN 10; 320 MG/1; MG/1
1 TABLET ORAL DAILY
Qty: 90 TABLET | Refills: 1 | Status: SHIPPED | OUTPATIENT
Start: 2023-05-03

## 2023-05-03 RX ORDER — VENLAFAXINE HYDROCHLORIDE 150 MG/1
150 CAPSULE, EXTENDED RELEASE ORAL DAILY
Qty: 90 CAPSULE | Refills: 1 | Status: SHIPPED | OUTPATIENT
Start: 2023-05-03

## 2023-05-03 RX ORDER — VENLAFAXINE HYDROCHLORIDE 75 MG/1
75 CAPSULE, EXTENDED RELEASE ORAL DAILY
Qty: 90 CAPSULE | Refills: 1 | Status: SHIPPED | OUTPATIENT
Start: 2023-05-03

## 2023-05-03 NOTE — PROGRESS NOTES
Patient was seen today through synchronous audio/video technology. Verbal consent was obtained. The patient was located at home. Vitals signs were obtained by patient and recorded.     I was located at our [facility] office for this telehealth visit.    Chief Complaint  Hyperlipidemia    History of Present Illness  Ramiro Vanegas is a 43 y.o. male presenting via video-conference today for follow-up regarding medication adjustments at his last visit.    He is here today with his girlfriend who is with him at his last office visit and remains very supportive related to his health care.    His blood pressure has been much better with change to Exforge.  Good home readings today at 135/80.  No side effects.    He has been taking naltrexone intermittently to help with alcohol cravings but continues to drink on a daily basis and understands the recommendation to seek treatment given his pattern of drinking and behaviors is consistent with untreated alcoholism.    Flareups with depression after he found a friend dead due to a drug overdose near his house.  He is not yet ready for counseling but will consider.  No suicidal ideations.  Interested in adjustment with his Effexor XR.     No problems with started on Crestor for hyperlipidemia and GERD remains controlled with omeprazole.      The following portions of the patient's history were reviewed and updated as appropriate: allergies, current medications, past family history, past medical history, past social history, past surgical history and problem list.    Review of Systems   Constitutional: Negative for appetite change, chills, fever and unexpected weight change.   HENT: Negative for hearing loss.    Eyes: Negative for visual disturbance.   Respiratory: Negative for chest tightness, shortness of breath and wheezing.    Cardiovascular: Negative for chest pain, palpitations and leg swelling.   Gastrointestinal: Negative for abdominal  "pain.   Musculoskeletal: Negative for arthralgias, back pain and gait problem.   Skin: Negative for rash.   Neurological: Negative for dizziness and headaches.   Psychiatric/Behavioral: Negative for agitation, confusion and suicidal ideas. The patient is nervous/anxious.         See HPI       Objective  /80   Ht 167.6 cm (66\")   Wt 80.7 kg (178 lb)   BMI 28.73 kg/m²     Physical Exam  Constitutional:       General: He is not in acute distress.     Appearance: Normal appearance. He is not ill-appearing.   HENT:      Head: Normocephalic and atraumatic.      Right Ear: External ear normal.      Left Ear: External ear normal.      Nose: Nose normal.   Eyes:      Extraocular Movements: Extraocular movements intact.      Conjunctiva/sclera: Conjunctivae normal.      Pupils: Pupils are equal, round, and reactive to light.   Pulmonary:      Effort: Pulmonary effort is normal. No respiratory distress.   Musculoskeletal:         General: Normal range of motion.      Cervical back: Normal range of motion.   Skin:     Findings: No rash.   Neurological:      General: No focal deficit present.      Mental Status: He is alert and oriented to person, place, and time.   Psychiatric:      Comments: Flareups with depression and grieving after finding a friend that had passed away from an overdose.           Assessment/Plan   Diagnoses and all orders for this visit:    1. Hypertension, essential (Primary)  Assessment & Plan:  Hypertension is improving with treatment.  Continue current treatment regimen.  Blood pressure will be reassessed at the next regular appointment.    Orders:  -     amLODIPine-valsartan (Exforge)  MG per tablet; Take 1 tablet by mouth Daily.  Dispense: 90 tablet; Refill: 1    2. Hyperlipidemia, mixed  -     rosuvastatin (Crestor) 20 MG tablet; Take 1 tablet by mouth Daily. For high cholesterol  Dispense: 90 tablet; Refill: 1    3. Seasonal allergies  -     loratadine (Claritin) 10 MG tablet; Take 1 " tablet by mouth Daily. For allergies  Dispense: 90 tablet; Refill: 1    4. Chronic GERD  Assessment & Plan:  Stable control with omeprazole    Orders:  -     omeprazole (priLOSEC) 40 MG capsule; Take 1 capsule by mouth Daily.  Dispense: 90 capsule; Refill: 1    5. Anxiety and depression  Assessment & Plan:  Discussed treatment options and we will go up to 225 mg with Effexor XR.  He failed a trial with Wellbutrin XL.  Strongly recommended counseling given his recent tragic event finding a friend that had passed away from overdose.    Close follow-up planned with recheck later this month or sooner if problems arise.    Orders:  -     venlafaxine XR (EFFEXOR-XR) 150 MG 24 hr capsule; Take 1 capsule by mouth Daily.  Dispense: 90 capsule; Refill: 1  -     venlafaxine XR (Effexor XR) 75 MG 24 hr capsule; Take 1 capsule by mouth Daily. (take with 150mg effexorXR for total of 225mg daily)  Dispense: 90 capsule; Refill: 1    6. Alcohol use disorder, severe, dependence  -     naltrexone (DEPADE) 50 MG tablet; Take 1 tablet by mouth Every Night.  Dispense: 90 tablet; Refill: 1      BMI is >= 25 and <30. (Overweight) The following options were offered after discussion;: exercise counseling/recommendations and nutrition counseling/recommendations       Return in about 4 weeks (around 5/31/2023) for Med recheck.    Scott Hernández MD

## 2023-05-03 NOTE — ASSESSMENT & PLAN NOTE
Discussed treatment options and we will go up to 225 mg with Effexor XR.  He failed a trial with Wellbutrin XL.  Strongly recommended counseling given his recent tragic event finding a friend that had passed away from overdose.    Close follow-up planned with recheck later this month or sooner if problems arise.

## 2023-05-31 ENCOUNTER — TELEMEDICINE (OUTPATIENT)
Dept: FAMILY MEDICINE CLINIC | Facility: CLINIC | Age: 43
End: 2023-05-31

## 2023-05-31 NOTE — PROGRESS NOTES
Unable to reach patient and review multiple attempts for telehealth visit today.  This encounter was created in error - please disregard.

## 2023-11-03 ENCOUNTER — TELEMEDICINE (OUTPATIENT)
Dept: FAMILY MEDICINE CLINIC | Facility: CLINIC | Age: 43
End: 2023-11-03
Payer: MEDICAID

## 2023-11-03 VITALS
WEIGHT: 178 LBS | BODY MASS INDEX: 28.61 KG/M2 | HEIGHT: 66 IN | DIASTOLIC BLOOD PRESSURE: 82 MMHG | SYSTOLIC BLOOD PRESSURE: 130 MMHG

## 2023-11-03 DIAGNOSIS — F10.20 ALCOHOL USE DISORDER, SEVERE, DEPENDENCE: ICD-10-CM

## 2023-11-03 DIAGNOSIS — I10 HYPERTENSION, ESSENTIAL: Primary | Chronic | ICD-10-CM

## 2023-11-03 DIAGNOSIS — G47.00 INSOMNIA, PERSISTENT: Chronic | ICD-10-CM

## 2023-11-03 DIAGNOSIS — K21.9 CHRONIC GERD: Chronic | ICD-10-CM

## 2023-11-03 DIAGNOSIS — J30.2 SEASONAL ALLERGIES: Chronic | ICD-10-CM

## 2023-11-03 DIAGNOSIS — F41.9 ANXIETY AND DEPRESSION: Chronic | ICD-10-CM

## 2023-11-03 DIAGNOSIS — F32.A ANXIETY AND DEPRESSION: Chronic | ICD-10-CM

## 2023-11-03 DIAGNOSIS — E78.2 HYPERLIPIDEMIA, MIXED: ICD-10-CM

## 2023-11-03 RX ORDER — AMLODIPINE AND VALSARTAN 10; 320 MG/1; MG/1
1 TABLET ORAL DAILY
Qty: 90 TABLET | Refills: 1 | Status: SHIPPED | OUTPATIENT
Start: 2023-11-03

## 2023-11-03 RX ORDER — ROSUVASTATIN CALCIUM 20 MG/1
20 TABLET, COATED ORAL DAILY
Qty: 90 TABLET | Refills: 1 | Status: SHIPPED | OUTPATIENT
Start: 2023-11-03

## 2023-11-03 RX ORDER — OMEPRAZOLE 40 MG/1
40 CAPSULE, DELAYED RELEASE ORAL DAILY
Qty: 90 CAPSULE | Refills: 1 | Status: SHIPPED | OUTPATIENT
Start: 2023-11-03

## 2023-11-03 RX ORDER — VENLAFAXINE HYDROCHLORIDE 150 MG/1
150 CAPSULE, EXTENDED RELEASE ORAL DAILY
Qty: 90 CAPSULE | Refills: 1 | Status: SHIPPED | OUTPATIENT
Start: 2023-11-03

## 2023-11-03 RX ORDER — LORATADINE 10 MG/1
10 TABLET ORAL DAILY
Qty: 90 TABLET | Refills: 1 | Status: SHIPPED | OUTPATIENT
Start: 2023-11-03

## 2023-11-03 NOTE — PROGRESS NOTES
Patient was seen today through synchronous audio/video technology. Verbal consent was obtained. The patient was located at home. Vitals signs were obtained by patient and recorded.     I was located at our [facility] office for this telehealth visit.    Chief Complaint  Med Refill    History of Present Illness  Ramiro Vanegas is a 43 y.o. male presenting via video-conference today for  follow-up regarding medication adjustments at his last visit.     His blood pressure has been much better with change to Exforge. Good home readings which have been under 140/90.  No side effects.     He had been taking naltrexone intermittently to help with alcohol cravings but stopped given problems with headaches as a side effect.  He reports he has cut down on his drinking but still drinks on a daily basis.    He does understand recommendation to seek treatment given his pattern of drinking in the past has been consistent with diagnoses of alcohol use disorder with severe dependence.    Depression and anxiety doing well with Effexor XR.  He has reduced this back to 150 mg daily.    He does continue on omeprazole for GERD without any problems or dysphagia.  He also continues with Crestor for hyperlipidemia.    We will plan for fasting labs at his physical at our next visit together in May.       The following portions of the patient's history were reviewed and updated as appropriate: allergies, current medications, past family history, past medical history, past social history, past surgical history and problem list.    Review of Systems   Constitutional:  Negative for appetite change, chills, fever and unexpected weight change.   HENT:  Negative for hearing loss.    Eyes:  Negative for visual disturbance.   Respiratory:  Negative for chest tightness, shortness of breath and wheezing.    Cardiovascular:  Negative for chest pain, palpitations and leg swelling.   Gastrointestinal:  Negative for  "abdominal pain.   Musculoskeletal:  Negative for arthralgias, back pain and gait problem.   Skin:  Negative for rash.   Neurological:  Negative for dizziness and headaches.   Psychiatric/Behavioral:  Negative for agitation and confusion. The patient is not nervous/anxious.        Objective  /82   Ht 167.6 cm (66\")   Wt 80.7 kg (178 lb)   BMI 28.73 kg/m²     Physical Exam  Constitutional:       General: He is not in acute distress.     Appearance: Normal appearance. He is not ill-appearing.   HENT:      Head: Normocephalic and atraumatic.      Right Ear: External ear normal.      Left Ear: External ear normal.      Nose: Nose normal.   Eyes:      Extraocular Movements: Extraocular movements intact.      Conjunctiva/sclera: Conjunctivae normal.      Pupils: Pupils are equal, round, and reactive to light.   Pulmonary:      Effort: Pulmonary effort is normal. No respiratory distress.   Musculoskeletal:         General: Normal range of motion.      Cervical back: Normal range of motion.   Skin:     Findings: No rash.   Neurological:      General: No focal deficit present.      Mental Status: He is alert and oriented to person, place, and time.   Psychiatric:         Mood and Affect: Mood normal.         Behavior: Behavior normal.         Thought Content: Thought content normal.           Assessment/Plan   Diagnoses and all orders for this visit:    1. Hypertension, essential (Primary)  Assessment & Plan:  Hypertension is improving with treatment.  Continue current treatment regimen.  Blood pressure will be reassessed at the next regular appointment.    Orders:  -     amLODIPine-valsartan (Exforge)  MG per tablet; Take 1 tablet by mouth Daily.  Dispense: 90 tablet; Refill: 1    2. Anxiety and depression  Assessment & Plan:  Patient's depression is recurrent and is mild without psychosis. Their depression is currently in partial remission and the condition is improving with treatment. This will be reassessed " at the next regular appointment. F/U as described:patient will continue current medication therapy.    Orders:  -     venlafaxine XR (EFFEXOR-XR) 150 MG 24 hr capsule; Take 1 capsule by mouth Daily.  Dispense: 90 capsule; Refill: 1    3. Chronic GERD  Assessment & Plan:  Stable control with omeprazole    Orders:  -     omeprazole (priLOSEC) 40 MG capsule; Take 1 capsule by mouth Daily.  Dispense: 90 capsule; Refill: 1    4. Insomnia, persistent  Assessment & Plan:  Off trazodone given headaches.  Discussed his alcohol use disorder may be negatively affecting his sleep patterns.  Encouraged alcohol cessation as above.      5. Seasonal allergies  Assessment & Plan:  Continue Flonase and Claritin    Orders:  -     loratadine (Claritin) 10 MG tablet; Take 1 tablet by mouth Daily. For allergies  Dispense: 90 tablet; Refill: 1    6. Alcohol use disorder, severe, dependence  Assessment & Plan:  Continue to encourage abstinence and sobriety.  He is off naltrexone given side effects reported he has cut down his drinking but still drinks on a daily basis.      7. Hyperlipidemia, mixed  Assessment & Plan:  Lipid abnormalities are improving with treatment.  Pharmacotherapy as ordered.  Lipids will be reassessed in 6 months.    Orders:  -     rosuvastatin (Crestor) 20 MG tablet; Take 1 tablet by mouth Daily. For high cholesterol  Dispense: 90 tablet; Refill: 1                Return in about 6 months (around 5/3/2024) for Annual physical.    Scott Hernández MD

## 2023-11-03 NOTE — ASSESSMENT & PLAN NOTE
Continue to encourage abstinence and sobriety.  He is off naltrexone given side effects reported he has cut down his drinking but still drinks on a daily basis.

## 2023-11-03 NOTE — PROGRESS NOTES
Patient was seen today through synchronous audio/video technology. Verbal consent was obtained. The patient was located at home. Vitals signs were obtained by patient and recorded.     I was located at our [facility] office for this telehealth visit.    Chief Complaint  Med Refill    History of Present Illness  Ramiro Vanegas is a 43 y.o. male presenting via video-conference today for  follow-up regarding medication adjustments at his last visit.     His blood pressure has been much better with change to Exforge. Good home readings which have been under 140/90.  No side effects.     He had been taking naltrexone intermittently to help with alcohol cravings but stopped given problems with headaches as a side effect.  He reports he has cut down on his drinking but still drinks on a daily basis.    He does understand recommendation to seek treatment given his pattern of drinking in the past has been consistent with diagnoses of alcohol use disorder with severe dependence.    Depression and anxiety doing well with Effexor XR.  He has reduced this back to 150 mg daily.    He does continue on omeprazole for GERD without any problems or dysphagia.  He also continues with Crestor for hyperlipidemia.    We will plan for fasting labs at his physical at our next visit together in May.       The following portions of the patient's history were reviewed and updated as appropriate: allergies, current medications, past family history, past medical history, past social history, past surgical history and problem list.    Review of Systems   Constitutional:  Negative for appetite change, chills, fever and unexpected weight change.   HENT:  Negative for hearing loss.    Eyes:  Negative for visual disturbance.   Respiratory:  Negative for chest tightness, shortness of breath and wheezing.    Cardiovascular:  Negative for chest pain, palpitations and leg swelling.   Gastrointestinal:  Negative for  "abdominal pain.   Musculoskeletal:  Negative for arthralgias, back pain and gait problem.   Skin:  Negative for rash.   Neurological:  Negative for dizziness and headaches.   Psychiatric/Behavioral:  Negative for agitation and confusion. The patient is not nervous/anxious.        Objective  /82   Ht 167.6 cm (66\")   Wt 80.7 kg (178 lb)   BMI 28.73 kg/m²     Physical Exam  Constitutional:       General: He is not in acute distress.     Appearance: Normal appearance. He is not ill-appearing.   HENT:      Head: Normocephalic and atraumatic.      Right Ear: External ear normal.      Left Ear: External ear normal.      Nose: Nose normal.   Eyes:      Extraocular Movements: Extraocular movements intact.      Conjunctiva/sclera: Conjunctivae normal.      Pupils: Pupils are equal, round, and reactive to light.   Pulmonary:      Effort: Pulmonary effort is normal. No respiratory distress.   Musculoskeletal:         General: Normal range of motion.      Cervical back: Normal range of motion.   Skin:     Findings: No rash.   Neurological:      General: No focal deficit present.      Mental Status: He is alert and oriented to person, place, and time.   Psychiatric:         Mood and Affect: Mood normal.         Behavior: Behavior normal.         Thought Content: Thought content normal.           Assessment/Plan   Diagnoses and all orders for this visit:    1. Hypertension, essential (Primary)  -     amLODIPine-valsartan (Exforge)  MG per tablet; Take 1 tablet by mouth Daily.  Dispense: 90 tablet; Refill: 1    2. Anxiety and depression  -     venlafaxine XR (EFFEXOR-XR) 150 MG 24 hr capsule; Take 1 capsule by mouth Daily.  Dispense: 90 capsule; Refill: 1    3. Chronic GERD  -     omeprazole (priLOSEC) 40 MG capsule; Take 1 capsule by mouth Daily.  Dispense: 90 capsule; Refill: 1    4. Insomnia, persistent    5. Seasonal allergies  -     loratadine (Claritin) 10 MG tablet; Take 1 tablet by mouth Daily. For allergies "  Dispense: 90 tablet; Refill: 1    6. Alcohol use disorder, severe, dependence    7. Hyperlipidemia, mixed  -     rosuvastatin (Crestor) 20 MG tablet; Take 1 tablet by mouth Daily. For high cholesterol  Dispense: 90 tablet; Refill: 1                No follow-ups on file.    Scott Hernández MD

## 2023-12-18 DIAGNOSIS — F41.9 ANXIETY AND DEPRESSION: Chronic | ICD-10-CM

## 2023-12-18 DIAGNOSIS — F32.A ANXIETY AND DEPRESSION: Chronic | ICD-10-CM

## 2023-12-18 DIAGNOSIS — K21.9 CHRONIC GERD: Chronic | ICD-10-CM

## 2023-12-18 DIAGNOSIS — I10 HYPERTENSION, ESSENTIAL: Chronic | ICD-10-CM

## 2023-12-18 DIAGNOSIS — E78.2 HYPERLIPIDEMIA, MIXED: ICD-10-CM

## 2023-12-18 RX ORDER — VENLAFAXINE HYDROCHLORIDE 150 MG/1
150 CAPSULE, EXTENDED RELEASE ORAL DAILY
Qty: 90 CAPSULE | Refills: 0 | Status: SHIPPED | OUTPATIENT
Start: 2023-12-18

## 2023-12-18 RX ORDER — AMLODIPINE AND VALSARTAN 10; 320 MG/1; MG/1
1 TABLET ORAL DAILY
Qty: 90 TABLET | Refills: 0 | Status: SHIPPED | OUTPATIENT
Start: 2023-12-18

## 2023-12-18 RX ORDER — ROSUVASTATIN CALCIUM 20 MG/1
20 TABLET, COATED ORAL DAILY
Qty: 90 TABLET | Refills: 0 | Status: SHIPPED | OUTPATIENT
Start: 2023-12-18

## 2023-12-18 RX ORDER — OMEPRAZOLE 40 MG/1
40 CAPSULE, DELAYED RELEASE ORAL DAILY
Qty: 90 CAPSULE | Refills: 0 | Status: SHIPPED | OUTPATIENT
Start: 2023-12-18

## 2023-12-18 NOTE — TELEPHONE ENCOUNTER
Caller: Guilherme Ramiro A    Relationship: Self    Best call back number: [phone]     Requested Prescriptions:   Requested Prescriptions     Pending Prescriptions Disp Refills    omeprazole (priLOSEC) 40 MG capsule 90 capsule 1     Sig: Take 1 capsule by mouth Daily.    rosuvastatin (Crestor) 20 MG tablet 90 tablet 1     Sig: Take 1 tablet by mouth Daily. For high cholesterol    venlafaxine XR (EFFEXOR-XR) 150 MG 24 hr capsule 90 capsule 1     Sig: Take 1 capsule by mouth Daily.    amLODIPine-valsartan (Exforge)  MG per tablet 90 tablet 1     Sig: Take 1 tablet by mouth Daily.        Pharmacy where request should be sent: [address] [phone] PH - [phone] FX     Last office visit with prescribing clinician: 3/29/2023   Last telemedicine visit with prescribing clinician: 11/3/2023   Next office visit with prescribing clinician: Visit date not found     Additional details provided by patient: IS OUT.    Does the patient have less than a 3 day supply:  [x] Yes  [] No    Would you like a call back once the refill request has been completed: [] Yes [x] No    If the office needs to give you a call back, can they leave a voicemail: [] Yes [x] No    Asim Ordonez Rep   12/18/23 12:32 EST

## 2024-05-16 DIAGNOSIS — J30.2 SEASONAL ALLERGIES: Chronic | ICD-10-CM

## 2024-05-16 RX ORDER — LORATADINE 10 MG/1
10 TABLET ORAL DAILY
Qty: 30 TABLET | Refills: 1 | Status: SHIPPED | OUTPATIENT
Start: 2024-05-16

## 2024-07-11 DIAGNOSIS — J30.2 SEASONAL ALLERGIES: Chronic | ICD-10-CM

## 2024-07-11 RX ORDER — LORATADINE 10 MG/1
10 TABLET ORAL DAILY
Qty: 30 TABLET | Refills: 1 | Status: SHIPPED | OUTPATIENT
Start: 2024-07-11

## 2024-09-07 DIAGNOSIS — F41.9 ANXIETY AND DEPRESSION: Chronic | ICD-10-CM

## 2024-09-07 DIAGNOSIS — F32.A ANXIETY AND DEPRESSION: Chronic | ICD-10-CM

## 2024-09-07 DIAGNOSIS — K21.9 CHRONIC GERD: Chronic | ICD-10-CM

## 2024-09-07 RX ORDER — OMEPRAZOLE 40 MG/1
40 CAPSULE, DELAYED RELEASE ORAL DAILY
Qty: 30 CAPSULE | Refills: 1 | Status: SHIPPED | OUTPATIENT
Start: 2024-09-07

## 2024-09-07 RX ORDER — VENLAFAXINE HYDROCHLORIDE 150 MG/1
CAPSULE, EXTENDED RELEASE ORAL
Qty: 30 CAPSULE | Refills: 1 | Status: SHIPPED | OUTPATIENT
Start: 2024-09-07

## 2024-09-07 NOTE — TELEPHONE ENCOUNTER
PT last seen in Nov 2023.. Past due for followup visit - please get him on my schedule sometime in the next 1-2 mos - I did send in refills but he must keep his followup visit - can be telehealth and if needed I can work him in at noon on a thurs or Friday with telehealth.  Thanks

## 2024-09-10 DIAGNOSIS — J30.2 SEASONAL ALLERGIES: Chronic | ICD-10-CM

## 2024-09-11 RX ORDER — LORATADINE 10 MG/1
10 TABLET ORAL DAILY
Qty: 30 TABLET | Refills: 1 | Status: SHIPPED | OUTPATIENT
Start: 2024-09-11

## 2024-09-26 ENCOUNTER — TELEPHONE (OUTPATIENT)
Dept: FAMILY MEDICINE CLINIC | Facility: CLINIC | Age: 44
End: 2024-09-26
Payer: MEDICAID

## 2024-10-14 ENCOUNTER — OFFICE VISIT (OUTPATIENT)
Dept: FAMILY MEDICINE CLINIC | Facility: CLINIC | Age: 44
End: 2024-10-14
Payer: MEDICAID

## 2024-10-14 VITALS
DIASTOLIC BLOOD PRESSURE: 84 MMHG | WEIGHT: 183.8 LBS | SYSTOLIC BLOOD PRESSURE: 126 MMHG | HEIGHT: 66 IN | BODY MASS INDEX: 29.54 KG/M2 | OXYGEN SATURATION: 98 % | HEART RATE: 96 BPM

## 2024-10-14 DIAGNOSIS — I10 HYPERTENSION, ESSENTIAL: Chronic | ICD-10-CM

## 2024-10-14 DIAGNOSIS — E78.2 HYPERLIPIDEMIA, MIXED: Chronic | ICD-10-CM

## 2024-10-14 DIAGNOSIS — F52.21 MALE ERECTILE DISORDER (CODE): ICD-10-CM

## 2024-10-14 DIAGNOSIS — F32.A ANXIETY AND DEPRESSION: Chronic | ICD-10-CM

## 2024-10-14 DIAGNOSIS — G47.9 SLEEP DISTURBANCES: ICD-10-CM

## 2024-10-14 DIAGNOSIS — Z12.5 PROSTATE CANCER SCREENING: ICD-10-CM

## 2024-10-14 DIAGNOSIS — K21.9 CHRONIC GERD: Chronic | ICD-10-CM

## 2024-10-14 DIAGNOSIS — Z13.1 DIABETES MELLITUS SCREENING: ICD-10-CM

## 2024-10-14 DIAGNOSIS — E66.3 OVERWEIGHT (BMI 25.0-29.9): ICD-10-CM

## 2024-10-14 DIAGNOSIS — G47.8 NON-RESTORATIVE SLEEP: ICD-10-CM

## 2024-10-14 DIAGNOSIS — R40.0 DAYTIME SOMNOLENCE: ICD-10-CM

## 2024-10-14 DIAGNOSIS — F41.9 ANXIETY AND DEPRESSION: Chronic | ICD-10-CM

## 2024-10-14 DIAGNOSIS — Z00.00 GENERAL MEDICAL EXAM: Primary | ICD-10-CM

## 2024-10-14 PROBLEM — M25.50 POLYARTHRALGIA: Status: RESOLVED | Noted: 2024-10-14 | Resolved: 2024-10-14

## 2024-10-14 PROBLEM — G47.00 INSOMNIA, PERSISTENT: Chronic | Status: RESOLVED | Noted: 2023-01-04 | Resolved: 2024-10-14

## 2024-10-14 PROBLEM — J30.2 SEASONAL ALLERGIES: Chronic | Status: RESOLVED | Noted: 2022-06-28 | Resolved: 2024-10-14

## 2024-10-14 PROBLEM — M25.50 POLYARTHRALGIA: Status: ACTIVE | Noted: 2024-10-14

## 2024-10-14 PROBLEM — M79.672 LEFT FOOT PAIN: Status: RESOLVED | Noted: 2023-03-29 | Resolved: 2024-10-14

## 2024-10-14 PROBLEM — F10.20 ALCOHOL USE DISORDER, SEVERE, DEPENDENCE: Status: RESOLVED | Noted: 2023-03-29 | Resolved: 2024-10-14

## 2024-10-14 PROCEDURE — 1159F MED LIST DOCD IN RCRD: CPT | Performed by: FAMILY MEDICINE

## 2024-10-14 PROCEDURE — 3079F DIAST BP 80-89 MM HG: CPT | Performed by: FAMILY MEDICINE

## 2024-10-14 PROCEDURE — 3074F SYST BP LT 130 MM HG: CPT | Performed by: FAMILY MEDICINE

## 2024-10-14 PROCEDURE — 99396 PREV VISIT EST AGE 40-64: CPT | Performed by: FAMILY MEDICINE

## 2024-10-14 PROCEDURE — 1160F RVW MEDS BY RX/DR IN RCRD: CPT | Performed by: FAMILY MEDICINE

## 2024-10-14 RX ORDER — AMLODIPINE AND VALSARTAN 10; 320 MG/1; MG/1
1 TABLET ORAL DAILY
Qty: 90 TABLET | Refills: 2 | Status: SHIPPED | OUTPATIENT
Start: 2024-10-14

## 2024-10-14 RX ORDER — VENLAFAXINE HYDROCHLORIDE 150 MG/1
150 CAPSULE, EXTENDED RELEASE ORAL DAILY
Qty: 90 CAPSULE | Refills: 2 | Status: SHIPPED | OUTPATIENT
Start: 2024-10-14

## 2024-10-14 RX ORDER — ROSUVASTATIN CALCIUM 20 MG/1
20 TABLET, COATED ORAL DAILY
Qty: 90 TABLET | Refills: 2 | Status: SHIPPED | OUTPATIENT
Start: 2024-10-14

## 2024-10-14 RX ORDER — OMEPRAZOLE 40 MG/1
40 CAPSULE, DELAYED RELEASE ORAL DAILY
Qty: 90 CAPSULE | Refills: 2 | Status: SHIPPED | OUTPATIENT
Start: 2024-10-14

## 2024-10-14 NOTE — PROGRESS NOTES
"Chief Complaint  Physical     Subjective    History of Present Illness:  Ramiro Vanegas is a 44 y.o. male who presents today for physical exam and medication refills     His blood pressure has been much better with change to Exforge. Good home readings which have been under 140/90.  No side effects.     Problems with non-restorative sleep, daytime fatigue, and HTN.  Girlfriend reports irregular breathing and snoring - would like setup for OSEAS workup.    He has significantly cut back on alcohol use!  Still smoking, not ready to quit    Declines flu, [id number] and covid booster    Depression and anxiety doing well with Effexor XR.  He did reduced this back to 150 mg daily.    He does continue on omeprazole for GERD without any problems or dysphagia.  He also continues with Crestor for hyperlipidemia.     ED problems ongoing - discussed tx options - wants to wait at this time but will let me know if interested in viagra or cialis to try prn     Objective   Vital Signs:   /84 (BP Location: Right arm, Patient Position: Sitting, Cuff Size: Adult)   Pulse 96   Ht 167.6 cm (66\")   Wt 83.4 kg (183 lb 12.8 oz)   SpO2 98%   BMI 29.67 kg/m²     Review of Systems   Constitutional:  Positive for fatigue. Negative for appetite change, chills and fever.   HENT:  Negative for hearing loss.    Eyes:  Negative for blurred vision.   Respiratory:  Negative for chest tightness.    Cardiovascular:  Negative for chest pain.   Gastrointestinal:  Negative for abdominal pain.   Musculoskeletal:  Negative for gait problem.   Skin:  Negative for rash.   Psychiatric/Behavioral:  Positive for sleep disturbance. Negative for depressed mood.        Past History:  Medical History: has a past medical history of GERD without esophagitis and Recurrent major depression in partial remission.   Surgical History: has no past surgical history on file.   Family History: family history includes Breast cancer in his mother; Depression in his mother; " Diabetes in his father; Hearing loss in his father; Hypertension in his mother.   Social History: reports that he has been smoking. He has never used smokeless tobacco. Alcohol use questions deferred to the physician. He reports that he does not use drugs.      Current Outpatient Medications:     amLODIPine-valsartan (Exforge)  MG per tablet, Take 1 tablet by mouth Daily., Disp: 90 tablet, Rfl: 2    omeprazole (priLOSEC) 40 MG capsule, Take 1 capsule by mouth Daily., Disp: 90 capsule, Rfl: 2    rosuvastatin (Crestor) 20 MG tablet, Take 1 tablet by mouth Daily. For high cholesterol, Disp: 90 tablet, Rfl: 2    venlafaxine XR (EFFEXOR-XR) 150 MG 24 hr capsule, Take 1 capsule by mouth Daily., Disp: 90 capsule, Rfl: 2    Allergies: Patient has no known allergies.    Physical Exam  Constitutional:       Appearance: Normal appearance.   HENT:      Head: Normocephalic.      Right Ear: External ear normal.      Left Ear: External ear normal.      Nose: Nose normal.   Eyes:      Pupils: Pupils are equal, round, and reactive to light.   Cardiovascular:      Rate and Rhythm: Normal rate and regular rhythm.      Heart sounds: Normal heart sounds.   Pulmonary:      Effort: Pulmonary effort is normal.      Breath sounds: Normal breath sounds.   Musculoskeletal:         General: Normal range of motion.      Cervical back: Normal range of motion.   Skin:     General: Skin is warm and dry.   Neurological:      General: No focal deficit present.      Mental Status: He is alert.   Psychiatric:         Mood and Affect: Mood normal.         Behavior: Behavior normal.         Thought Content: Thought content normal.          Result Review                   Assessment and Plan  Diagnoses and all orders for this visit:    1. General medical exam (Primary)  Assessment & Plan:  Discussed together health maintenance and screening along with vaccination options and healthy diet and exercise habits as part of the preventative counseling at  their physical exam today.    Declines vaccination update     Orders:  -     CBC Auto Differential; Future  -     Comprehensive Metabolic Panel; Future  -     Lipid Panel; Future  -     TSH; Future  -     T4, Free; Future    2. Diabetes mellitus screening  -     Hemoglobin A1c; Future    3. Prostate cancer screening  -     PSA Screen; Future    4. Hypertension, essential  Assessment & Plan:  Hypertension is improving with treatment.  Continue current treatment regimen.  Blood pressure will be reassessed at the next regular appointment.    Orders:  -     amLODIPine-valsartan (Exforge)  MG per tablet; Take 1 tablet by mouth Daily.  Dispense: 90 tablet; Refill: 2    5. Hyperlipidemia, mixed  Assessment & Plan:  Lipid abnormalities are improving with treatment.  Pharmacotherapy as ordered.  Lipids will be reassessed in 6 months.    Orders:  -     rosuvastatin (Crestor) 20 MG tablet; Take 1 tablet by mouth Daily. For high cholesterol  Dispense: 90 tablet; Refill: 2    6. Chronic GERD  Assessment & Plan:  Stable control with omeprazole    Orders:  -     omeprazole (priLOSEC) 40 MG capsule; Take 1 capsule by mouth Daily.  Dispense: 90 capsule; Refill: 2    7. Anxiety and depression  Assessment & Plan:  Patient's depression is recurrent and is mild without psychosis. Their depression is currently in partial remission and the condition is improving with treatment. This will be reassessed at the next regular appointment. F/U as described:patient will continue current medication therapy.    Orders:  -     venlafaxine XR (EFFEXOR-XR) 150 MG 24 hr capsule; Take 1 capsule by mouth Daily.  Dispense: 90 capsule; Refill: 2    8. Sleep disturbances  Assessment & Plan:  Scheduling workup for OSEAS with sleep medicine    Orders:  -     Home Sleep Study; Future    9. Overweight (BMI 25.0-29.9)  Assessment & Plan:  Patient's (Body mass index is 29.67 kg/m².) indicates that they are overweight with health conditions that include  hypertension, dyslipidemias, and GERD . Weight is unchanged. BMI is above average; BMI management plan is completed. We discussed portion control and increasing exercise.       10. Non-restorative sleep  -     Home Sleep Study; Future    11. Daytime somnolence  -     Home Sleep Study; Future    12. Male erectile disorder (CODE)  Assessment & Plan:  ED problems ongoing - discussed tx options - wants to wait at this time but will let me know if interested in viagra or cialis to try prn                     Follow Up  Return in about 6 months (around 4/14/2025) for Med recheck.    Scott Hernández MD

## 2024-10-14 NOTE — ASSESSMENT & PLAN NOTE
Discussed together health maintenance and screening along with vaccination options and healthy diet and exercise habits as part of the preventative counseling at their physical exam today.    Declines vaccination update

## 2024-10-14 NOTE — ASSESSMENT & PLAN NOTE
Patient's (Body mass index is 29.67 kg/m².) indicates that they are overweight with health conditions that include hypertension, dyslipidemias, and GERD . Weight is unchanged. BMI is above average; BMI management plan is completed. We discussed portion control and increasing exercise.

## 2024-10-14 NOTE — ASSESSMENT & PLAN NOTE
ED problems ongoing - discussed tx options - wants to wait at this time but will let me know if interested in viagra or cialis to try prn

## 2024-10-15 ENCOUNTER — PATIENT MESSAGE (OUTPATIENT)
Dept: FAMILY MEDICINE CLINIC | Facility: CLINIC | Age: 44
End: 2024-10-15
Payer: MEDICAID

## 2024-10-15 LAB
ALBUMIN SERPL-MCNC: 5 G/DL (ref 4.1–5.1)
ALP SERPL-CCNC: 160 IU/L (ref 44–121)
ALT SERPL-CCNC: 50 IU/L (ref 0–44)
AST SERPL-CCNC: 38 IU/L (ref 0–40)
BASOPHILS # BLD AUTO: 0.1 X10E3/UL (ref 0–0.2)
BASOPHILS NFR BLD AUTO: 1 %
BILIRUB SERPL-MCNC: <0.2 MG/DL (ref 0–1.2)
BUN SERPL-MCNC: 12 MG/DL (ref 6–24)
BUN/CREAT SERPL: 14 (ref 9–20)
CALCIUM SERPL-MCNC: 9.8 MG/DL (ref 8.7–10.2)
CHLORIDE SERPL-SCNC: 103 MMOL/L (ref 96–106)
CHOLEST SERPL-MCNC: 131 MG/DL (ref 100–199)
CO2 SERPL-SCNC: 21 MMOL/L (ref 20–29)
CREAT SERPL-MCNC: 0.83 MG/DL (ref 0.76–1.27)
EGFRCR SERPLBLD CKD-EPI 2021: 112 ML/MIN/1.73
EOSINOPHIL # BLD AUTO: 0.1 X10E3/UL (ref 0–0.4)
EOSINOPHIL NFR BLD AUTO: 1 %
ERYTHROCYTE [DISTWIDTH] IN BLOOD BY AUTOMATED COUNT: 13.1 % (ref 11.6–15.4)
GLOBULIN SER CALC-MCNC: 2.2 G/DL (ref 1.5–4.5)
GLUCOSE SERPL-MCNC: 106 MG/DL (ref 70–99)
HBA1C MFR BLD: 5.9 % (ref 4.8–5.6)
HCT VFR BLD AUTO: 43.3 % (ref 37.5–51)
HDLC SERPL-MCNC: 71 MG/DL
HGB BLD-MCNC: 14.5 G/DL (ref 13–17.7)
IMM GRANULOCYTES # BLD AUTO: 0 X10E3/UL (ref 0–0.1)
IMM GRANULOCYTES NFR BLD AUTO: 0 %
LDLC SERPL CALC-MCNC: 40 MG/DL (ref 0–99)
LYMPHOCYTES # BLD AUTO: 1.9 X10E3/UL (ref 0.7–3.1)
LYMPHOCYTES NFR BLD AUTO: 19 %
MCH RBC QN AUTO: 30.7 PG (ref 26.6–33)
MCHC RBC AUTO-ENTMCNC: 33.5 G/DL (ref 31.5–35.7)
MCV RBC AUTO: 92 FL (ref 79–97)
MONOCYTES # BLD AUTO: 0.7 X10E3/UL (ref 0.1–0.9)
MONOCYTES NFR BLD AUTO: 7 %
NEUTROPHILS # BLD AUTO: 7.1 X10E3/UL (ref 1.4–7)
NEUTROPHILS NFR BLD AUTO: 72 %
PLATELET # BLD AUTO: 417 X10E3/UL (ref 150–450)
POTASSIUM SERPL-SCNC: 4 MMOL/L (ref 3.5–5.2)
PROT SERPL-MCNC: 7.2 G/DL (ref 6–8.5)
PSA SERPL-MCNC: 0.3 NG/ML (ref 0–4)
RBC # BLD AUTO: 4.73 X10E6/UL (ref 4.14–5.8)
SODIUM SERPL-SCNC: 143 MMOL/L (ref 134–144)
T4 FREE SERPL-MCNC: 1.11 NG/DL (ref 0.82–1.77)
TRIGL SERPL-MCNC: 117 MG/DL (ref 0–149)
TSH SERPL DL<=0.005 MIU/L-ACNC: 1.48 UIU/ML (ref 0.45–4.5)
VLDLC SERPL CALC-MCNC: 20 MG/DL (ref 5–40)
WBC # BLD AUTO: 9.9 X10E3/UL (ref 3.4–10.8)

## 2025-02-02 ENCOUNTER — PATIENT MESSAGE (OUTPATIENT)
Dept: FAMILY MEDICINE CLINIC | Facility: CLINIC | Age: 45
End: 2025-02-02
Payer: MEDICAID

## 2025-02-02 DIAGNOSIS — F52.21 MALE ERECTILE DISORDER (CODE): Primary | ICD-10-CM

## 2025-02-03 RX ORDER — SILDENAFIL CITRATE 20 MG/1
TABLET ORAL
Qty: 30 TABLET | Refills: 5 | Status: SHIPPED | OUTPATIENT
Start: 2025-02-03

## 2025-03-27 NOTE — ASSESSMENT & PLAN NOTE
Spoke with patient regarding cholesterol results and Dr Hernandez recommendation.     ----- Message from Dr. Rosenda Cormier MD sent at 3/27/2025 10:04 AM EDT -----  Total cholesterol and bad cholesterol are very high.    In addition to following a low cholesterol diet, recommendation is to start cholesterol medication at this time.    Atorvastatin 10 mg 1 tablet at bedtime.    Recommend starting co-Q10 100 mg/day to mitigate any muscle side effects 1 week prior to starting the atorvastatin.    Labs will be drawn for repeat cholesterol in 3 months to assess the response of the cholesterol to the medication treatment.   Stable control with omeprazole

## 2025-04-14 ENCOUNTER — PATIENT MESSAGE (OUTPATIENT)
Dept: FAMILY MEDICINE CLINIC | Facility: CLINIC | Age: 45
End: 2025-04-14
Payer: MEDICAID

## 2025-04-14 DIAGNOSIS — K21.9 GERD WITHOUT ESOPHAGITIS: Primary | ICD-10-CM

## 2025-04-15 RX ORDER — PANTOPRAZOLE SODIUM 40 MG/1
40 TABLET, DELAYED RELEASE ORAL DAILY
Qty: 30 TABLET | Refills: 1 | Status: SHIPPED | OUTPATIENT
Start: 2025-04-15

## 2025-06-03 ENCOUNTER — OFFICE VISIT (OUTPATIENT)
Age: 45
End: 2025-06-03
Payer: MEDICAID

## 2025-06-03 VITALS
SYSTOLIC BLOOD PRESSURE: 130 MMHG | DIASTOLIC BLOOD PRESSURE: 90 MMHG | HEART RATE: 90 BPM | HEIGHT: 66 IN | OXYGEN SATURATION: 94 % | WEIGHT: 187 LBS | BODY MASS INDEX: 30.05 KG/M2

## 2025-06-03 DIAGNOSIS — I45.10 INCOMPLETE RBBB: ICD-10-CM

## 2025-06-03 DIAGNOSIS — I10 HYPERTENSION, ESSENTIAL: Chronic | ICD-10-CM

## 2025-06-03 DIAGNOSIS — G47.10 HYPERSOMNIA: Primary | ICD-10-CM

## 2025-06-03 PROCEDURE — 3080F DIAST BP >= 90 MM HG: CPT | Performed by: INTERNAL MEDICINE

## 2025-06-03 PROCEDURE — 99203 OFFICE O/P NEW LOW 30 MIN: CPT | Performed by: INTERNAL MEDICINE

## 2025-06-03 PROCEDURE — 93000 ELECTROCARDIOGRAM COMPLETE: CPT | Performed by: INTERNAL MEDICINE

## 2025-06-03 PROCEDURE — 3075F SYST BP GE 130 - 139MM HG: CPT | Performed by: INTERNAL MEDICINE

## 2025-06-03 RX ORDER — OMEPRAZOLE 40 MG/1
1 CAPSULE, DELAYED RELEASE ORAL DAILY
COMMUNITY
Start: 2025-05-30

## 2025-06-03 NOTE — PROGRESS NOTES
[facility] Provider Note     Date:   2025   Name: Ramiro Vanegas  :   1980  PCP: Scott Hernández MD    Chief Complaint   Patient presents with    Sleep evaluation     Pt states he is here today to establish care for sleep. He does snore, witnessed apneas and is always tired. Neck size 15.75 in.       Subjective     History of Present Illness  Ramiro Vanegas is a 45 y.o. male with notable snoring and witnessed sleep apneas with fatigue. Presents for sleep study.  EPWORTH Scale is 9.  Fatigue is present. Falls asleep fast but wake up numerous times in the night. When does wake up does not feel rested.  Snoring is positive. Dry mouth when wakes up.  Wife has noticed apnea. Will shake awake when notices.  Will wake self up with a gasp occasionally. Has a lot of jerking.  Reports that has like a brain fog and can't remember anything.            No specialty comments available.    No Known Allergies    Current Outpatient Medications:     amLODIPine-valsartan (Exforge)  MG per tablet, Take 1 tablet by mouth Daily., Disp: 90 tablet, Rfl: 2    omeprazole (priLOSEC) 40 MG capsule, Take 1 capsule by mouth Daily., Disp: , Rfl:     rosuvastatin (Crestor) 20 MG tablet, Take 1 tablet by mouth Daily. For high cholesterol, Disp: 90 tablet, Rfl: 2    venlafaxine XR (EFFEXOR-XR) 150 MG 24 hr capsule, Take 1 capsule by mouth Daily., Disp: 90 capsule, Rfl: 2    Past Medical History:   Diagnosis Date    GERD without esophagitis     Hyperlipidemia     Hypertension     Recurrent major depression in partial remission       History reviewed. No pertinent surgical history.  Family History   Problem Relation Age of Onset    Breast cancer Mother     Depression Mother     Hypertension Mother     Diabetes Father     Hearing loss Father     Prostate cancer Father     Kidney disease Father     Heart failure Sister     Arrhythmia Sister      Social History     Socioeconomic History    Marital  "status: Single   Tobacco Use    Smoking status: Every Day     Current packs/day: 1.50     Average packs/day: 1.5 packs/day for 30.2 years (45.3 ttl pk-yrs)     Types: Cigarettes     Start date: 4/1/1995     Passive exposure: Never    Smokeless tobacco: Never   Vaping Use    Vaping status: Never Used   Substance and Sexual Activity    Alcohol use: Yes     Comment: 1 jerman qd    Drug use: Never    Sexual activity: Yes       Objective     Vital Signs:  /90 (BP Location: Right arm, Patient Position: Sitting, Cuff Size: Adult)   Pulse 90   Ht 167.6 cm (66\")   Wt 84.8 kg (187 lb)   SpO2 94%   BMI 30.18 kg/m²   Estimated body mass index is 30.18 kg/m² as calculated from the following:    Height as of this encounter: 167.6 cm (66\").    Weight as of this encounter: 84.8 kg (187 lb).         Physical Exam  Constitutional:       Appearance: Normal appearance. He is well-developed.   HENT:      Head: Normocephalic and atraumatic.   Eyes:      General: No scleral icterus.     Pupils: Pupils are equal, round, and reactive to light.   Cardiovascular:      Rate and Rhythm: Normal rate and regular rhythm.      Heart sounds: Normal heart sounds. No murmur heard.  Pulmonary:      Breath sounds: Normal breath sounds. No wheezing or rhonchi.   Musculoskeletal:      Right lower leg: No edema.      Left lower leg: No edema.   Skin:     Capillary Refill: Capillary refill takes less than 2 seconds.      Coloration: Skin is not cyanotic.      Nails: There is no clubbing.   Neurological:      Mental Status: He is alert and oriented to person, place, and time.      Motor: No weakness.      Gait: Gait normal.   Psychiatric:         Mood and Affect: Mood normal.         Behavior: Behavior is cooperative.         Thought Content: Thought content normal.         Mallampati 3.         ECG 12 Lead    Date/Time: 6/3/2025 11:27 AM  Performed by: Dia Ames MD    Authorized by: Dia Ames MD  Comparison: compared with " previous ECG from 11/21/2015  Similar to previous ECG  Rhythm: sinus rhythm  Rate: normal  Conduction: incomplete right bundle branch block  ST Segments: ST segments normal  T Waves: T waves normal  QRS axis: normal  Other: no other findings    Clinical impression: abnormal EKG           Assessment and Plan     ASSESSMENTS AND ORDERS  Diagnoses and all orders for this visit:    1. Hypersomnia (Primary)  -     Home Sleep Study; Future    2. Hypertension, essential    Other orders  -     ECG 12 Lead      Other orders    ECG 12 Lead      PLAN  -strong suspicion of sleep apnea  -planning HST, discussed may need in lab in the future  -will have follow up in [place] for geographic location  -HTN controlled, OSEAS control will help HTN  -EKG updated, history of incomplete RBBB, asymptomatic          Follow Up  Return in about 4 months (around 10/3/2025) for results of testing, Patient OK with NP visit.    SHANKAR Ames MD  Cardiology and Sleep [facility]  06/03/2025     Please note that this explicitly excludes time spent on other separate billable services such as performing procedures or test interpretation, when applicable.    This note was created using dictation software which occasionally transcribes nonsensical phrases. Please contact the provider if any clarification is needed.

## 2025-06-09 ENCOUNTER — PATIENT ROUNDING (BHMG ONLY) (OUTPATIENT)
Dept: CARDIOLOGY | Facility: CLINIC | Age: 45
End: 2025-06-09
Payer: MEDICAID

## 2025-06-09 NOTE — PROGRESS NOTES
..My name is Kayla Medley and I am the Practice Manager for [facility] [place].    I would like to thank you for being a loyal patient. If you do not mind I would like to ask you a few questions about your recent visit with us.  Please feel free to reply if you wish to provide us with feedback on your first visit with our practice.    First, could you tell me what went well with your recent visit?    Secondly, we are always looking for ways to make our patients' experiences even better.  Do you have any recommendations on ways we may improve?    Finally, overall were you satisfied with your first visit to us as a [facility] facility?    In the next few days, you will be receiving a Patient Experience Survey.      Thank you for taking the time to answer a few questions today.  I hope you have a good day.

## 2025-06-17 DIAGNOSIS — G47.10 HYPERSOMNIA: ICD-10-CM

## 2025-06-17 DIAGNOSIS — G47.36 HYPOXEMIA ASSOCIATED WITH SLEEP: ICD-10-CM

## 2025-06-17 DIAGNOSIS — G47.33 OSA (OBSTRUCTIVE SLEEP APNEA): Primary | ICD-10-CM

## 2025-07-01 ENCOUNTER — PATIENT MESSAGE (OUTPATIENT)
Dept: FAMILY MEDICINE CLINIC | Facility: CLINIC | Age: 45
End: 2025-07-01
Payer: MEDICAID

## 2025-07-01 DIAGNOSIS — A49.3 NGU DUE TO UREAPLASMA UREALYTICUM: Primary | ICD-10-CM

## 2025-07-01 DIAGNOSIS — N34.1 NGU DUE TO UREAPLASMA UREALYTICUM: Primary | ICD-10-CM

## 2025-07-02 RX ORDER — DOXYCYCLINE 100 MG/1
100 CAPSULE ORAL 2 TIMES DAILY
Qty: 20 CAPSULE | Refills: 0 | Status: SHIPPED | OUTPATIENT
Start: 2025-07-02 | End: 2025-07-12

## 2025-07-15 DIAGNOSIS — G47.33 OSA (OBSTRUCTIVE SLEEP APNEA): ICD-10-CM

## 2025-07-15 DIAGNOSIS — G47.36 HYPOXEMIA ASSOCIATED WITH SLEEP: ICD-10-CM

## 2025-07-28 DIAGNOSIS — E78.2 HYPERLIPIDEMIA, MIXED: Chronic | ICD-10-CM

## 2025-07-28 DIAGNOSIS — I10 HYPERTENSION, ESSENTIAL: Chronic | ICD-10-CM

## 2025-07-28 RX ORDER — ROSUVASTATIN CALCIUM 20 MG/1
20 TABLET, COATED ORAL DAILY
Qty: 90 TABLET | Refills: 2 | OUTPATIENT
Start: 2025-07-28

## 2025-07-28 RX ORDER — AMLODIPINE AND VALSARTAN 10; 320 MG/1; MG/1
1 TABLET ORAL DAILY
Qty: 90 TABLET | Refills: 2 | OUTPATIENT
Start: 2025-07-28

## 2025-08-05 ENCOUNTER — TELEPHONE (OUTPATIENT)
Dept: CARDIOLOGY | Facility: CLINIC | Age: 45
End: 2025-08-05
Payer: MEDICAID

## 2025-08-05 DIAGNOSIS — G47.33 OSA (OBSTRUCTIVE SLEEP APNEA): Primary | ICD-10-CM

## 2025-08-08 DIAGNOSIS — I10 HYPERTENSION, ESSENTIAL: Chronic | ICD-10-CM

## 2025-08-08 DIAGNOSIS — E78.2 HYPERLIPIDEMIA, MIXED: Chronic | ICD-10-CM

## 2025-08-08 DIAGNOSIS — F32.A ANXIETY AND DEPRESSION: Chronic | ICD-10-CM

## 2025-08-08 DIAGNOSIS — F41.9 ANXIETY AND DEPRESSION: Chronic | ICD-10-CM

## 2025-08-08 RX ORDER — VENLAFAXINE HYDROCHLORIDE 150 MG/1
150 CAPSULE, EXTENDED RELEASE ORAL DAILY
Qty: 90 CAPSULE | Refills: 2 | Status: CANCELLED | OUTPATIENT
Start: 2025-08-08

## 2025-08-08 RX ORDER — ROSUVASTATIN CALCIUM 20 MG/1
20 TABLET, COATED ORAL DAILY
Qty: 90 TABLET | Refills: 2 | Status: CANCELLED | OUTPATIENT
Start: 2025-08-08

## 2025-08-09 DIAGNOSIS — E78.2 HYPERLIPIDEMIA, MIXED: Chronic | ICD-10-CM

## 2025-08-09 DIAGNOSIS — F32.A ANXIETY AND DEPRESSION: Chronic | ICD-10-CM

## 2025-08-09 DIAGNOSIS — F41.9 ANXIETY AND DEPRESSION: Chronic | ICD-10-CM

## 2025-08-09 RX ORDER — ROSUVASTATIN CALCIUM 20 MG/1
20 TABLET, COATED ORAL DAILY
Qty: 90 TABLET | Refills: 2 | Status: SHIPPED | OUTPATIENT
Start: 2025-08-09

## 2025-08-09 RX ORDER — VENLAFAXINE HYDROCHLORIDE 150 MG/1
150 CAPSULE, EXTENDED RELEASE ORAL DAILY
Qty: 90 CAPSULE | Refills: 2 | Status: SHIPPED | OUTPATIENT
Start: 2025-08-09

## 2025-08-11 RX ORDER — OMEPRAZOLE 40 MG/1
40 CAPSULE, DELAYED RELEASE ORAL DAILY
Qty: 30 CAPSULE | Refills: 0 | Status: SHIPPED | OUTPATIENT
Start: 2025-08-11 | End: 2025-08-13 | Stop reason: SDUPTHER

## 2025-08-11 RX ORDER — AMLODIPINE AND VALSARTAN 10; 320 MG/1; MG/1
1 TABLET ORAL DAILY
Qty: 30 TABLET | Refills: 0 | Status: SHIPPED | OUTPATIENT
Start: 2025-08-11 | End: 2025-08-13 | Stop reason: SDUPTHER

## 2025-08-13 ENCOUNTER — OFFICE VISIT (OUTPATIENT)
Dept: FAMILY MEDICINE CLINIC | Facility: CLINIC | Age: 45
End: 2025-08-13
Payer: MEDICAID

## 2025-08-13 VITALS
OXYGEN SATURATION: 97 % | HEART RATE: 86 BPM | WEIGHT: 183.7 LBS | HEIGHT: 66 IN | SYSTOLIC BLOOD PRESSURE: 134 MMHG | DIASTOLIC BLOOD PRESSURE: 90 MMHG | BODY MASS INDEX: 29.52 KG/M2

## 2025-08-13 DIAGNOSIS — I10 HYPERTENSION, ESSENTIAL: Primary | ICD-10-CM

## 2025-08-13 DIAGNOSIS — R73.9 HYPERGLYCEMIA: ICD-10-CM

## 2025-08-13 DIAGNOSIS — K21.9 GERD WITHOUT ESOPHAGITIS: ICD-10-CM

## 2025-08-13 DIAGNOSIS — F32.A ANXIETY AND DEPRESSION: Chronic | ICD-10-CM

## 2025-08-13 DIAGNOSIS — E78.2 HYPERLIPIDEMIA, MIXED: ICD-10-CM

## 2025-08-13 DIAGNOSIS — G47.33 OSA ON CPAP: ICD-10-CM

## 2025-08-13 DIAGNOSIS — E66.3 OVERWEIGHT (BMI 25.0-29.9): ICD-10-CM

## 2025-08-13 DIAGNOSIS — F41.9 ANXIETY AND DEPRESSION: Chronic | ICD-10-CM

## 2025-08-13 PROBLEM — G47.9 SLEEP DISTURBANCES: Status: RESOLVED | Noted: 2024-10-14 | Resolved: 2025-08-13

## 2025-08-13 RX ORDER — VENLAFAXINE HYDROCHLORIDE 150 MG/1
150 CAPSULE, EXTENDED RELEASE ORAL DAILY
Qty: 30 CAPSULE | Refills: 6 | Status: SHIPPED | OUTPATIENT
Start: 2025-08-13

## 2025-08-13 RX ORDER — ROSUVASTATIN CALCIUM 20 MG/1
20 TABLET, COATED ORAL DAILY
Qty: 30 TABLET | Refills: 6 | Status: SHIPPED | OUTPATIENT
Start: 2025-08-13

## 2025-08-13 RX ORDER — AMLODIPINE AND VALSARTAN 10; 320 MG/1; MG/1
1 TABLET ORAL DAILY
Qty: 30 TABLET | Refills: 6 | Status: SHIPPED | OUTPATIENT
Start: 2025-08-13

## 2025-08-13 RX ORDER — OMEPRAZOLE 40 MG/1
40 CAPSULE, DELAYED RELEASE ORAL DAILY
Qty: 30 CAPSULE | Refills: 6 | Status: SHIPPED | OUTPATIENT
Start: 2025-08-13

## 2025-08-14 LAB
ALBUMIN SERPL-MCNC: 5.1 G/DL (ref 3.5–5.2)
ALBUMIN/GLOB SERPL: 2 G/DL
ALP SERPL-CCNC: 259 U/L (ref 39–117)
ALT SERPL-CCNC: 64 U/L (ref 1–41)
AST SERPL-CCNC: 71 U/L (ref 1–40)
BILIRUB SERPL-MCNC: 0.4 MG/DL (ref 0–1.2)
BUN SERPL-MCNC: 10 MG/DL (ref 6–20)
BUN/CREAT SERPL: 10.1 (ref 7–25)
CALCIUM SERPL-MCNC: 10.3 MG/DL (ref 8.6–10.5)
CHLORIDE SERPL-SCNC: 101 MMOL/L (ref 98–107)
CHOLEST SERPL-MCNC: 133 MG/DL (ref 0–200)
CO2 SERPL-SCNC: 23 MMOL/L (ref 22–29)
CREAT SERPL-MCNC: 0.99 MG/DL (ref 0.76–1.27)
EGFRCR SERPLBLD CKD-EPI 2021: 96.9 ML/MIN/1.73
GLOBULIN SER CALC-MCNC: 2.5 GM/DL
GLUCOSE SERPL-MCNC: 108 MG/DL (ref 65–99)
HBA1C MFR BLD: 5.7 % (ref 4.8–5.6)
HDLC SERPL-MCNC: 73 MG/DL (ref 40–60)
LDLC SERPL CALC-MCNC: 33 MG/DL (ref 0–100)
POTASSIUM SERPL-SCNC: 3.8 MMOL/L (ref 3.5–5.2)
PROT SERPL-MCNC: 7.6 G/DL (ref 6–8.5)
SODIUM SERPL-SCNC: 141 MMOL/L (ref 136–145)
TRIGL SERPL-MCNC: 169 MG/DL (ref 0–150)
VLDLC SERPL CALC-MCNC: 27 MG/DL (ref 5–40)